# Patient Record
Sex: FEMALE | Race: WHITE | NOT HISPANIC OR LATINO | Employment: FULL TIME | URBAN - NONMETROPOLITAN AREA
[De-identification: names, ages, dates, MRNs, and addresses within clinical notes are randomized per-mention and may not be internally consistent; named-entity substitution may affect disease eponyms.]

---

## 2018-01-11 ENCOUNTER — HOSPITAL ENCOUNTER (EMERGENCY)
Facility: HOSPITAL | Age: 58
Discharge: HOME | End: 2018-01-11
Attending: EMERGENCY MEDICINE | Admitting: EMERGENCY MEDICINE

## 2018-01-11 VITALS
HEART RATE: 72 BPM | DIASTOLIC BLOOD PRESSURE: 92 MMHG | TEMPERATURE: 98.9 F | BODY MASS INDEX: 35.08 KG/M2 | HEIGHT: 59 IN | RESPIRATION RATE: 18 BRPM | OXYGEN SATURATION: 97 % | WEIGHT: 174 LBS | SYSTOLIC BLOOD PRESSURE: 190 MMHG

## 2018-01-11 DIAGNOSIS — R55 SYNCOPE, UNSPECIFIED SYNCOPE TYPE: Primary | ICD-10-CM

## 2018-01-11 LAB
ANION GAP SERPL CALCULATED.3IONS-SCNC: 9 MMOL/L (ref 5–15)
BASOPHILS # BLD AUTO: 0.02 10*3/MM3 (ref 0–0.2)
BASOPHILS NFR BLD AUTO: 0.2 % (ref 0–2)
BUN BLD-MCNC: 11 MG/DL (ref 7–21)
BUN/CREAT SERPL: 17.2 (ref 7–25)
CALCIUM SPEC-SCNC: 9 MG/DL (ref 8.4–10.2)
CHLORIDE SERPL-SCNC: 101 MMOL/L (ref 95–110)
CO2 SERPL-SCNC: 29 MMOL/L (ref 22–31)
CREAT BLD-MCNC: 0.64 MG/DL (ref 0.5–1)
DEPRECATED RDW RBC AUTO: 39.8 FL (ref 36.4–46.3)
EOSINOPHIL # BLD AUTO: 0.43 10*3/MM3 (ref 0–0.7)
EOSINOPHIL NFR BLD AUTO: 3.9 % (ref 0–7)
ERYTHROCYTE [DISTWIDTH] IN BLOOD BY AUTOMATED COUNT: 13.8 % (ref 11.5–14.5)
GFR SERPL CREATININE-BSD FRML MDRD: 96 ML/MIN/1.73 (ref 51–120)
GLUCOSE BLD-MCNC: 110 MG/DL (ref 60–100)
HCT VFR BLD AUTO: 38.4 % (ref 35–45)
HGB BLD-MCNC: 13.1 G/DL (ref 12–15.5)
HOLD SPECIMEN: NORMAL
IMM GRANULOCYTES # BLD: 0.02 10*3/MM3 (ref 0–0.02)
IMM GRANULOCYTES NFR BLD: 0.2 % (ref 0–0.5)
LYMPHOCYTES # BLD AUTO: 2.45 10*3/MM3 (ref 0.6–4.2)
LYMPHOCYTES NFR BLD AUTO: 22.5 % (ref 10–50)
MCH RBC QN AUTO: 27.2 PG (ref 26.5–34)
MCHC RBC AUTO-ENTMCNC: 34.1 G/DL (ref 31.4–36)
MCV RBC AUTO: 79.7 FL (ref 80–98)
MONOCYTES # BLD AUTO: 0.89 10*3/MM3 (ref 0–0.9)
MONOCYTES NFR BLD AUTO: 8.2 % (ref 0–12)
NEUTROPHILS # BLD AUTO: 7.09 10*3/MM3 (ref 2–8.6)
NEUTROPHILS NFR BLD AUTO: 65 % (ref 37–80)
PLATELET # BLD AUTO: 348 10*3/MM3 (ref 150–450)
PMV BLD AUTO: 9.9 FL (ref 8–12)
POTASSIUM BLD-SCNC: 3.3 MMOL/L (ref 3.5–5.1)
RBC # BLD AUTO: 4.82 10*6/MM3 (ref 3.77–5.16)
SODIUM BLD-SCNC: 139 MMOL/L (ref 137–145)
WBC NRBC COR # BLD: 10.9 10*3/MM3 (ref 3.2–9.8)
WHOLE BLOOD HOLD SPECIMEN: NORMAL

## 2018-01-11 PROCEDURE — 93005 ELECTROCARDIOGRAM TRACING: CPT | Performed by: EMERGENCY MEDICINE

## 2018-01-11 PROCEDURE — 93010 ELECTROCARDIOGRAM REPORT: CPT | Performed by: INTERNAL MEDICINE

## 2018-01-11 PROCEDURE — 99283 EMERGENCY DEPT VISIT LOW MDM: CPT

## 2018-01-11 PROCEDURE — 85025 COMPLETE CBC W/AUTO DIFF WBC: CPT | Performed by: EMERGENCY MEDICINE

## 2018-01-11 PROCEDURE — 80048 BASIC METABOLIC PNL TOTAL CA: CPT | Performed by: EMERGENCY MEDICINE

## 2018-01-11 RX ORDER — HYDROCODONE BITARTRATE AND ACETAMINOPHEN 7.5; 325 MG/1; MG/1
1 TABLET ORAL EVERY 6 HOURS PRN
COMMUNITY

## 2018-01-11 RX ORDER — OMEPRAZOLE 40 MG/1
40 CAPSULE, DELAYED RELEASE ORAL DAILY
COMMUNITY

## 2018-01-12 NOTE — DISCHARGE INSTRUCTIONS
Syncope  Introduction  Syncope is when you lose temporarily pass out (faint). Signs that you may be about to pass out include:  · Feeling dizzy or light-headed.  · Feeling sick to your stomach (nauseous).  · Seeing all white or all black.  · Having cold, clammy skin.  If you passed out, get help right away. Call your local emergency services (331 in the [place] Do not drive yourself to the hospital.  Follow these instructions at home:  Pay attention to any changes in your symptoms. Take these actions to help with your condition:  · Have someone stay with you until you feel stable.  · Do not drive, use machinery, or play sports until your doctor says it is okay.  · Keep all follow-up visits as told by your doctor. This is important.  · If you start to feel like you might pass out, lie down right away and raise (elevate) your feet above the level of your heart. Breathe deeply and steadily. Wait until all of the symptoms are gone.  · Drink enough fluid to keep your pee (urine) clear or pale yellow.  · If you are taking blood pressure or heart medicine, get up slowly and spend many minutes getting ready to sit and then stand. This can help with dizziness.  · Take over-the-counter and prescription medicines only as told by your doctor.  Get help right away if:  · You have a very bad headache.  · You have unusual pain in your chest, tummy, or back.  · You are bleeding from your mouth or rectum.  · You have black or tarry poop (stool).  · You have a very fast or uneven heartbeat (palpitations).  · It hurts to breathe.  · You pass out once or more than once.  · You have jerky movements that you cannot control (seizure).  · You are confused.  · You have trouble walking.  · You are very weak.  · You have vision problems.  These symptoms may be an emergency. Do not wait to see if the symptoms will go away. Get medical help right away. Call your local emergency services (541 in the [place] Do not drive yourself to the  Lists of hospitals in the [place].   This information is not intended to replace advice given to you by your health care provider. Make sure you discuss any questions you have with your health care provider.  Document Released: 06/05/2009 Document Revised: 05/25/2017 Document Reviewed: 08/31/2016  © 2017 Elsevier      Hypertension  Hypertension, commonly called high blood pressure, is when the force of blood pumping through your arteries is too strong. Your arteries are the blood vessels that carry blood from your heart throughout your body. A blood pressure reading consists of a higher number over a lower number, such as 110/72. The higher number (systolic) is the pressure inside your arteries when your heart pumps. The lower number (diastolic) is the pressure inside your arteries when your heart relaxes. Ideally you want your blood pressure below 120/80.  Hypertension forces your heart to work harder to pump blood. Your arteries may become narrow or stiff. Having untreated or uncontrolled hypertension can cause heart attack, stroke, kidney disease, and other problems.  What increases the risk?  Some risk factors for high blood pressure are controllable. Others are not.  Risk factors you cannot control include:  · Race. You may be at higher risk if you are .  · Age. Risk increases with age.  · Gender. Men are at higher risk than women before age 45 years. After age 65, women are at higher risk than men.  Risk factors you can control include:  · Not getting enough exercise or physical activity.  · Being overweight.  · Getting too much fat, sugar, calories, or salt in your diet.  · Drinking too much alcohol.  What are the signs or symptoms?  Hypertension does not usually cause signs or symptoms. Extremely high blood pressure (hypertensive crisis) may cause headache, anxiety, shortness of breath, and nosebleed.  How is this diagnosed?  To check if you have hypertension, your health care provider will measure your blood  pressure while you are seated, with your arm held at the level of your heart. It should be measured at least twice using the same arm. Certain conditions can cause a difference in blood pressure between your right and left arms. A blood pressure reading that is higher than normal on one occasion does not mean that you need treatment. If it is not clear whether you have high blood pressure, you may be asked to return on a different day to have your blood pressure checked again. Or, you may be asked to monitor your blood pressure at home for 1 or more weeks.  How is this treated?  Treating high blood pressure includes making lifestyle changes and possibly taking medicine. Living a healthy lifestyle can help lower high blood pressure. You may need to change some of your habits.  Lifestyle changes may include:  · Following the DASH diet. This diet is high in fruits, vegetables, and whole grains. It is low in salt, red meat, and added sugars.  · Keep your sodium intake below 2,300 mg per day.  · Getting at least 30-45 minutes of aerobic exercise at least 4 times per week.  · Losing weight if necessary.  · Not smoking.  · Limiting alcoholic beverages.  · Learning ways to reduce stress.  Your health care provider may prescribe medicine if lifestyle changes are not enough to get your blood pressure under control, and if one of the following is true:  · You are 18-59 years of age and your systolic blood pressure is above 140.  · You are 60 years of age or older, and your systolic blood pressure is above 150.  · Your diastolic blood pressure is above 90.  · You have diabetes, and your systolic blood pressure is over 140 or your diastolic blood pressure is over 90.  · You have kidney disease and your blood pressure is above 140/90.  · You have heart disease and your blood pressure is above 140/90.  Your personal target blood pressure may vary depending on your medical conditions, your age, and other factors.  Follow these  instructions at home:  · Have your blood pressure rechecked as directed by your health care provider.  · Take medicines only as directed by your health care provider. Follow the directions carefully. Blood pressure medicines must be taken as prescribed. The medicine does not work as well when you skip doses. Skipping doses also puts you at risk for problems.  · Do not smoke.  · Monitor your blood pressure at home as directed by your health care provider.  Contact a health care provider if:  · You think you are having a reaction to medicines taken.  · You have recurrent headaches or feel dizzy.  · You have swelling in your ankles.  · You have trouble with your vision.  Get help right away if:  · You develop a severe headache or confusion.  · You have unusual weakness, numbness, or feel faint.  · You have severe chest or abdominal pain.  · You vomit repeatedly.  · You have trouble breathing.  This information is not intended to replace advice given to you by your health care provider. Make sure you discuss any questions you have with your health care provider.  Document Released: 12/18/2006 Document Revised: 05/25/2017 Document Reviewed: 10/10/2014  [email or web address] Interactive Patient Education © 2017 [employer]

## 2018-01-12 NOTE — ED PROVIDER NOTES
Subjective   History of Present Illness  57-year-old female who history of hypertension presents emergency Department after syncopal episode today.  She states she is in the hospital with her ex- as well as her father.  She went to [place] and came back.  She was gone her father had a seizure.  She felt very guilty about leaving him in the hospital was extremely upset with herself.  She went to the bathroom to beat on the sink when she became lightheaded and slumped to the ground.  She was caught by family members and did not fall or strike her head.  She denies having any chest pain difficulty breathing or headache during the episode.  After waking she denies having these symptoms as well.  Currently she denies having any symptoms and states that she wants to go back upstairs to be with her family members.  She does not take medications for blood pressure.  Review of Systems   Constitutional: Negative for chills and fever.   HENT: Negative for rhinorrhea, sinus pressure and sneezing.    Eyes: Negative for pain and redness.   Respiratory: Negative for cough, chest tightness and shortness of breath.    Cardiovascular: Negative for chest pain, palpitations and leg swelling.   Gastrointestinal: Negative for abdominal pain, diarrhea, nausea and vomiting.   Genitourinary: Negative for dysuria, flank pain, menstrual problem, pelvic pain, vaginal bleeding, vaginal discharge and vaginal pain.   Musculoskeletal: Negative for arthralgias, back pain and joint swelling.   Neurological: Positive for syncope. Negative for dizziness, weakness, numbness and headaches.   Hematological: Negative.    Psychiatric/Behavioral: Negative for self-injury and suicidal ideas.       Past Medical History:   Diagnosis Date   • Arthritis    • Fibromyalgia    • Hypertension        Allergies   Allergen Reactions   • Biaxin [Clarithromycin]    • Penicillins        History reviewed. No pertinent surgical history.    History reviewed. No  pertinent family history.    Social History     Social History   • Marital status:      Spouse name: N/A   • Number of children: N/A   • Years of education: N/A     Social History Main Topics   • Smoking status: Never Smoker   • Smokeless tobacco: Never Used   • Alcohol use No   • Drug use: None   • Sexual activity: Not Asked     Other Topics Concern   • None     Social History Narrative   • None           Objective   Physical Exam   Constitutional: She is oriented to person, place, and time. She appears well-developed and well-nourished.   HENT:   Head: Normocephalic and atraumatic.   Eyes: EOM are normal. Pupils are equal, round, and reactive to light.   Neck: Normal range of motion. Neck supple.   No carotid bruit   Cardiovascular: Normal rate, regular rhythm and normal heart sounds.    No murmur heard.  Pulmonary/Chest: Effort normal and breath sounds normal. No respiratory distress. She has no wheezes. She has no rales.   Abdominal: Soft. Bowel sounds are normal. She exhibits no distension. There is no tenderness.   Musculoskeletal: Normal range of motion.   Neurological: She is alert and oriented to person, place, and time.   Skin: Skin is warm and dry.   Psychiatric: She has a normal mood and affect.   Nursing note and vitals reviewed.      ECG 12 Lead    Date/Time: 1/11/2018 10:53 PM  Performed by: MARYANA COFFMAN  Authorized by: MARYANA COFFMAN   Interpreted by physician  Comments: Sinus rate of 73.  Normal QT/QTc interval.  Normal IN interval.  No significant change from previous EKG.  No concerns for WPW, Brugada, hypertrophic cardiomyopathy, long or short QT syndrome.  No STEMI               ED Course  ED Course      Labs Reviewed   BASIC METABOLIC PANEL - Abnormal; Notable for the following:        Result Value    Glucose 110 (*)     Potassium 3.3 (*)     All other components within normal limits   CBC WITH AUTO DIFFERENTIAL - Abnormal; Notable for the following:     WBC 10.90  (*)     MCV 79.7 (*)     All other components within normal limits   RAINBOW DRAW    Narrative:     The following orders were created for panel order [place] Draw.  Procedure                               Abnormality         Status                     ---------                               -----------         ------                     Light Blue Top[[id number]]                                    In process                 Green Top (Gel)[[id number]]                                                              Lavender Top[[id number]]                                                                 Gold Top - SST[[id number]]                                    In process                   Please view results for these tests on the individual orders.   CBC AND DIFFERENTIAL    Narrative:     The following orders were created for panel order CBC & Differential.  Procedure                               Abnormality         Status                     ---------                               -----------         ------                     CBC Auto Differential[[id number]]         Abnormal            Final result                 Please view results for these tests on the individual orders.   LIGHT BLUE TOP   GREEN TOP   LAVENDER TOP   GOLD TOP - SST                 MDM  Number of Diagnoses or Management Options  Syncope, unspecified syncope type:   Diagnosis management comments: Patient with what sounded of vasovagal syncopal episode.  We'll check an EKG is some basic lab work on her.  She is markedly hypertensive but she does not take any medications for her blood pressure.  I expect this is likely close to her baseline.  Like give her any blood pressure medications at this time she has no symptoms and do not believe this syncopal episode is related to her blood pressure.    Normal renal function.  Normal hemoglobin.  No signs of end organ damage from her hypertension.  As stated she has no chest pain multiple status headaches or  shortness of breath suggest workup for her hypertension at this time.  We'll discharge the patient and have her follow primary care doctor regarding her uncontrolled hypertension  EKG with no concern for lack laterality as etiology of patient's symptoms.  Remaining electrolytes and likely discharge the patient      Final diagnoses:   Syncope, unspecified syncope type            Nuno Gomez MD  01/11/18 [id number]

## 2019-01-05 ENCOUNTER — APPOINTMENT (OUTPATIENT)
Dept: GENERAL RADIOLOGY | Facility: HOSPITAL | Age: 59
End: 2019-01-05

## 2019-01-05 ENCOUNTER — HOSPITAL ENCOUNTER (EMERGENCY)
Facility: HOSPITAL | Age: 59
Discharge: HOME | End: 2019-01-05
Attending: FAMILY MEDICINE | Admitting: FAMILY MEDICINE

## 2019-01-05 VITALS
TEMPERATURE: 97.9 F | SYSTOLIC BLOOD PRESSURE: 160 MMHG | HEART RATE: 73 BPM | OXYGEN SATURATION: 96 % | BODY MASS INDEX: 34.27 KG/M2 | RESPIRATION RATE: 18 BRPM | DIASTOLIC BLOOD PRESSURE: 76 MMHG | HEIGHT: 59 IN | WEIGHT: 170 LBS

## 2019-01-05 DIAGNOSIS — S93.401A SPRAIN OF RIGHT ANKLE, UNSPECIFIED LIGAMENT, INITIAL ENCOUNTER: Primary | ICD-10-CM

## 2019-01-05 PROCEDURE — 73630 X-RAY EXAM OF FOOT: CPT

## 2019-01-05 PROCEDURE — 99284 EMERGENCY DEPT VISIT MOD MDM: CPT

## 2019-01-05 PROCEDURE — 73610 X-RAY EXAM OF ANKLE: CPT

## 2019-01-05 RX ORDER — PROPRANOLOL HYDROCHLORIDE 10 MG/1
10 TABLET ORAL 2 TIMES DAILY
COMMUNITY
End: 2019-09-04 | Stop reason: HOSPADM

## 2019-01-05 RX ORDER — CLONIDINE HYDROCHLORIDE 0.2 MG/1
0.2 TABLET ORAL ONCE
Status: COMPLETED | OUTPATIENT
Start: 2019-01-05 | End: 2019-01-05

## 2019-01-05 RX ADMIN — CLONIDINE HYDROCHLORIDE 0.2 MG: 0.2 TABLET ORAL at 15:50
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
## 2019-09-01 ENCOUNTER — HOSPITAL ENCOUNTER (INPATIENT)
Facility: HOSPITAL | Age: 59
LOS: 3 days | Discharge: HOME | End: 2019-09-04
Attending: EMERGENCY MEDICINE | Admitting: HOSPITALIST

## 2019-09-01 ENCOUNTER — APPOINTMENT (OUTPATIENT)
Dept: GENERAL RADIOLOGY | Facility: HOSPITAL | Age: 59
End: 2019-09-01

## 2019-09-01 DIAGNOSIS — I21.4 NSTEMI (NON-ST ELEVATED MYOCARDIAL INFARCTION) (HCC): Primary | ICD-10-CM

## 2019-09-01 LAB
ANION GAP SERPL CALCULATED.3IONS-SCNC: 14 MMOL/L (ref 5–15)
APTT PPP: 31.4 SECONDS (ref 20–40.3)
BASOPHILS # BLD AUTO: 0.03 10*3/MM3 (ref 0–0.2)
BASOPHILS NFR BLD AUTO: 0.3 % (ref 0–1.5)
BUN BLD-MCNC: 6 MG/DL (ref 6–20)
BUN/CREAT SERPL: 9.1 (ref 7–25)
CALCIUM SPEC-SCNC: 9 MG/DL (ref 8.6–10.5)
CHLORIDE SERPL-SCNC: 102 MMOL/L (ref 98–107)
CO2 SERPL-SCNC: 27 MMOL/L (ref 22–29)
CREAT BLD-MCNC: 0.66 MG/DL (ref 0.57–1)
D-DIMER, QUANTITATIVE (MAD,POW, STR): 321 NG/ML (FEU) (ref 0–470)
DEPRECATED RDW RBC AUTO: 39.6 FL (ref 37–54)
EOSINOPHIL # BLD AUTO: 0.29 10*3/MM3 (ref 0–0.4)
EOSINOPHIL NFR BLD AUTO: 3.1 % (ref 0.3–6.2)
ERYTHROCYTE [DISTWIDTH] IN BLOOD BY AUTOMATED COUNT: 13.8 % (ref 12.3–15.4)
GFR SERPL CREATININE-BSD FRML MDRD: 92 ML/MIN/1.73
GLUCOSE BLD-MCNC: 105 MG/DL (ref 65–99)
HCT VFR BLD AUTO: 36.9 % (ref 34–46.6)
HGB BLD-MCNC: 12.7 G/DL (ref 12–15.9)
HOLD SPECIMEN: NORMAL
HOLD SPECIMEN: NORMAL
IMM GRANULOCYTES # BLD AUTO: 0.03 10*3/MM3 (ref 0–0.05)
IMM GRANULOCYTES NFR BLD AUTO: 0.3 % (ref 0–0.5)
INR PPP: 1.03 (ref 0.8–1.2)
LYMPHOCYTES # BLD AUTO: 2.58 10*3/MM3 (ref 0.7–3.1)
LYMPHOCYTES NFR BLD AUTO: 27.9 % (ref 19.6–45.3)
MCH RBC QN AUTO: 27.2 PG (ref 26.6–33)
MCHC RBC AUTO-ENTMCNC: 34.4 G/DL (ref 31.5–35.7)
MCV RBC AUTO: 79 FL (ref 79–97)
MONOCYTES # BLD AUTO: 0.58 10*3/MM3 (ref 0.1–0.9)
MONOCYTES NFR BLD AUTO: 6.3 % (ref 5–12)
NEUTROPHILS # BLD AUTO: 5.74 10*3/MM3 (ref 1.7–7)
NEUTROPHILS NFR BLD AUTO: 62.1 % (ref 42.7–76)
NRBC BLD AUTO-RTO: 0 /100 WBC (ref 0–0.2)
PLATELET # BLD AUTO: 293 10*3/MM3 (ref 140–450)
PMV BLD AUTO: 10.1 FL (ref 6–12)
POTASSIUM BLD-SCNC: 3 MMOL/L (ref 3.5–5.2)
PROTHROMBIN TIME: 13.3 SECONDS (ref 11.1–15.3)
RBC # BLD AUTO: 4.67 10*6/MM3 (ref 3.77–5.28)
SODIUM BLD-SCNC: 143 MMOL/L (ref 136–145)
TROPONIN T SERPL-MCNC: 0.21 NG/ML (ref 0–0.03)
TROPONIN T SERPL-MCNC: 0.39 NG/ML (ref 0–0.03)
TROPONIN T SERPL-MCNC: 0.43 NG/ML (ref 0–0.03)
WBC NRBC COR # BLD: 9.25 10*3/MM3 (ref 3.4–10.8)
WHOLE BLOOD HOLD SPECIMEN: NORMAL
WHOLE BLOOD HOLD SPECIMEN: NORMAL

## 2019-09-01 PROCEDURE — 85025 COMPLETE CBC W/AUTO DIFF WBC: CPT | Performed by: EMERGENCY MEDICINE

## 2019-09-01 PROCEDURE — 71046 X-RAY EXAM CHEST 2 VIEWS: CPT

## 2019-09-01 PROCEDURE — 80048 BASIC METABOLIC PNL TOTAL CA: CPT | Performed by: EMERGENCY MEDICINE

## 2019-09-01 PROCEDURE — 94799 UNLISTED PULMONARY SVC/PX: CPT

## 2019-09-01 PROCEDURE — 93005 ELECTROCARDIOGRAM TRACING: CPT

## 2019-09-01 PROCEDURE — 25010000002 MORPHINE PER 10 MG: Performed by: EMERGENCY MEDICINE

## 2019-09-01 PROCEDURE — 93005 ELECTROCARDIOGRAM TRACING: CPT | Performed by: INTERNAL MEDICINE

## 2019-09-01 PROCEDURE — 85730 THROMBOPLASTIN TIME PARTIAL: CPT | Performed by: EMERGENCY MEDICINE

## 2019-09-01 PROCEDURE — 93005 ELECTROCARDIOGRAM TRACING: CPT | Performed by: EMERGENCY MEDICINE

## 2019-09-01 PROCEDURE — 25010000002 ENOXAPARIN PER 10 MG: Performed by: EMERGENCY MEDICINE

## 2019-09-01 PROCEDURE — 84484 ASSAY OF TROPONIN QUANT: CPT | Performed by: EMERGENCY MEDICINE

## 2019-09-01 PROCEDURE — 25010000002 DIPHENHYDRAMINE PER 50 MG: Performed by: INTERNAL MEDICINE

## 2019-09-01 PROCEDURE — 94760 N-INVAS EAR/PLS OXIMETRY 1: CPT

## 2019-09-01 PROCEDURE — 85610 PROTHROMBIN TIME: CPT | Performed by: EMERGENCY MEDICINE

## 2019-09-01 PROCEDURE — 36415 COLL VENOUS BLD VENIPUNCTURE: CPT | Performed by: EMERGENCY MEDICINE

## 2019-09-01 PROCEDURE — 25010000002 ONDANSETRON PER 1 MG: Performed by: EMERGENCY MEDICINE

## 2019-09-01 PROCEDURE — 85379 FIBRIN DEGRADATION QUANT: CPT | Performed by: EMERGENCY MEDICINE

## 2019-09-01 PROCEDURE — 99285 EMERGENCY DEPT VISIT HI MDM: CPT

## 2019-09-01 RX ORDER — CLOPIDOGREL BISULFATE 75 MG/1
300 TABLET ORAL ONCE
Status: COMPLETED | OUTPATIENT
Start: 2019-09-01 | End: 2019-09-01

## 2019-09-01 RX ORDER — HYDROCODONE BITARTRATE AND ACETAMINOPHEN 7.5; 325 MG/1; MG/1
1 TABLET ORAL 2 TIMES DAILY PRN
Status: DISCONTINUED | OUTPATIENT
Start: 2019-09-01 | End: 2019-09-04 | Stop reason: HOSPADM

## 2019-09-01 RX ORDER — ACETAMINOPHEN 650 MG/1
650 SUPPOSITORY RECTAL EVERY 4 HOURS PRN
Status: DISCONTINUED | OUTPATIENT
Start: 2019-09-01 | End: 2019-09-04 | Stop reason: HOSPADM

## 2019-09-01 RX ORDER — NITROGLYCERIN 20 MG/100ML
10-50 INJECTION INTRAVENOUS
Status: DISCONTINUED | OUTPATIENT
Start: 2019-09-01 | End: 2019-09-02

## 2019-09-01 RX ORDER — ONDANSETRON 2 MG/ML
4 INJECTION INTRAMUSCULAR; INTRAVENOUS ONCE
Status: DISCONTINUED | OUTPATIENT
Start: 2019-09-01 | End: 2019-09-01

## 2019-09-01 RX ORDER — SODIUM CHLORIDE 0.9 % (FLUSH) 0.9 %
10 SYRINGE (ML) INJECTION AS NEEDED
Status: DISCONTINUED | OUTPATIENT
Start: 2019-09-01 | End: 2019-09-04 | Stop reason: HOSPADM

## 2019-09-01 RX ORDER — FAMOTIDINE 10 MG/ML
20 INJECTION, SOLUTION INTRAVENOUS ONCE
Status: COMPLETED | OUTPATIENT
Start: 2019-09-01 | End: 2019-09-01

## 2019-09-01 RX ORDER — ONDANSETRON 2 MG/ML
4 INJECTION INTRAMUSCULAR; INTRAVENOUS ONCE
Status: COMPLETED | OUTPATIENT
Start: 2019-09-01 | End: 2019-09-01

## 2019-09-01 RX ORDER — PANTOPRAZOLE SODIUM 40 MG/1
40 TABLET, DELAYED RELEASE ORAL EVERY MORNING
Status: DISCONTINUED | OUTPATIENT
Start: 2019-09-02 | End: 2019-09-04 | Stop reason: HOSPADM

## 2019-09-01 RX ORDER — DIPHENHYDRAMINE HYDROCHLORIDE 50 MG/ML
12.5 INJECTION INTRAMUSCULAR; INTRAVENOUS EVERY 6 HOURS PRN
Status: DISCONTINUED | OUTPATIENT
Start: 2019-09-01 | End: 2019-09-04 | Stop reason: HOSPADM

## 2019-09-01 RX ORDER — ACETAMINOPHEN 160 MG/5ML
650 SOLUTION ORAL EVERY 4 HOURS PRN
Status: DISCONTINUED | OUTPATIENT
Start: 2019-09-01 | End: 2019-09-04 | Stop reason: HOSPADM

## 2019-09-01 RX ORDER — NITROGLYCERIN 0.4 MG/1
0.4 TABLET SUBLINGUAL
Status: DISCONTINUED | OUTPATIENT
Start: 2019-09-01 | End: 2019-09-02

## 2019-09-01 RX ORDER — POTASSIUM CHLORIDE 750 MG/1
40 CAPSULE, EXTENDED RELEASE ORAL ONCE
Status: COMPLETED | OUTPATIENT
Start: 2019-09-01 | End: 2019-09-01

## 2019-09-01 RX ORDER — SODIUM CHLORIDE 9 MG/ML
100 INJECTION, SOLUTION INTRAVENOUS CONTINUOUS
Status: DISCONTINUED | OUTPATIENT
Start: 2019-09-01 | End: 2019-09-03

## 2019-09-01 RX ORDER — SODIUM CHLORIDE 0.9 % (FLUSH) 0.9 %
10 SYRINGE (ML) INJECTION EVERY 12 HOURS SCHEDULED
Status: DISCONTINUED | OUTPATIENT
Start: 2019-09-01 | End: 2019-09-04 | Stop reason: HOSPADM

## 2019-09-01 RX ORDER — ASPIRIN 81 MG/1
324 TABLET, CHEWABLE ORAL ONCE
Status: DISCONTINUED | OUTPATIENT
Start: 2019-09-01 | End: 2019-09-01

## 2019-09-01 RX ORDER — DIPHENHYDRAMINE HYDROCHLORIDE 50 MG/ML
12.5 INJECTION INTRAMUSCULAR; INTRAVENOUS ONCE
Status: COMPLETED | OUTPATIENT
Start: 2019-09-01 | End: 2019-09-01

## 2019-09-01 RX ORDER — ACETAMINOPHEN 325 MG/1
650 TABLET ORAL EVERY 4 HOURS PRN
Status: DISCONTINUED | OUTPATIENT
Start: 2019-09-01 | End: 2019-09-04 | Stop reason: HOSPADM

## 2019-09-01 RX ADMIN — SODIUM CHLORIDE 125 ML/HR: 9 INJECTION, SOLUTION INTRAVENOUS at 14:40

## 2019-09-01 RX ADMIN — CLOPIDOGREL BISULFATE 300 MG: 75 TABLET ORAL at 15:57

## 2019-09-01 RX ADMIN — NITROGLYCERIN 0.4 MG: 0.4 TABLET, ORALLY DISINTEGRATING SUBLINGUAL at 15:25

## 2019-09-01 RX ADMIN — MORPHINE SULFATE 4 MG: 4 INJECTION INTRAVENOUS at 14:41

## 2019-09-01 RX ADMIN — MORPHINE SULFATE 4 MG: 4 INJECTION INTRAVENOUS at 15:59

## 2019-09-01 RX ADMIN — ONDANSETRON 4 MG: 2 INJECTION INTRAMUSCULAR; INTRAVENOUS at 16:54

## 2019-09-01 RX ADMIN — POTASSIUM CHLORIDE 40 MEQ: 750 CAPSULE, EXTENDED RELEASE ORAL at 15:56

## 2019-09-01 RX ADMIN — FAMOTIDINE 20 MG: 10 INJECTION INTRAVENOUS at 14:43

## 2019-09-01 RX ADMIN — SODIUM CHLORIDE, PRESERVATIVE FREE 10 ML: 5 INJECTION INTRAVENOUS at 21:13

## 2019-09-01 RX ADMIN — NITROGLYCERIN 10 MCG/MIN: 20 INJECTION INTRAVENOUS at 16:09

## 2019-09-01 RX ADMIN — DIPHENHYDRAMINE HYDROCHLORIDE 12.5 MG: 50 INJECTION INTRAMUSCULAR; INTRAVENOUS at 16:35

## 2019-09-01 RX ADMIN — ONDANSETRON 4 MG: 2 INJECTION INTRAMUSCULAR; INTRAVENOUS at 14:46

## 2019-09-01 RX ADMIN — SODIUM CHLORIDE 125 ML/HR: 9 INJECTION, SOLUTION INTRAVENOUS at 21:20

## 2019-09-01 RX ADMIN — ENOXAPARIN SODIUM 70 MG: 80 INJECTION SUBCUTANEOUS at 16:11

## 2019-09-01 RX ADMIN — SODIUM CHLORIDE 1000 ML: 900 INJECTION, SOLUTION INTRAVENOUS at 16:45

## 2019-09-01 NOTE — ED NOTES
Pt is hypotensive at this time. Nitro drip stopped. MD made aware. New order for liter bolus NS.     Capri Valladares RN  09/01/19 [id number]

## 2019-09-01 NOTE — ED NOTES
Pt is complaining of itching all over. Natalia gave benadryl. Patient states that symptoms started shortly after lovenox injection. Pt is very nauseated at this time. Pt is dry heaving.      Capri Valladares RN  09/01/19 [id number]

## 2019-09-01 NOTE — ED PROVIDER NOTES
"Subjective   60yo female pmh significant ddd presents ED c/o acute onset substernal chest pain 1200hrs characterized as \"stabbing\" pain/radiating upper back, neck, LUE/associated soa, nausea/neg exac or relieve factors; pt rates pain 8/10 at time of exam.  ROS neg fever/cough/parada/edema/syncope/palpitations/hemoptysis.        History provided by:  Patient  Chest Pain   Pain location:  Substernal area  Pain quality: stabbing    Pain radiates to:  Neck, upper back and L arm  Pain severity:  Moderate  Onset quality:  Sudden  Duration:  3 hours  Timing:  Constant  Chronicity:  New  Associated symptoms: back pain, nausea and shortness of breath    Associated symptoms: no abdominal pain, no cough and no palpitations        Review of Systems   Constitutional: Negative.    HENT: Negative for congestion.    Respiratory: Positive for shortness of breath. Negative for cough.    Cardiovascular: Positive for chest pain. Negative for palpitations and leg swelling.   Gastrointestinal: Positive for nausea. Negative for abdominal pain.   Genitourinary: Negative.    Musculoskeletal: Positive for back pain.   Skin: Negative.    Allergic/Immunologic: Negative for immunocompromised state.   Neurological: Negative for syncope.   All other systems reviewed and are negative.      Past Medical History:   Diagnosis Date   • Arthritis    • Fibromyalgia    • Hypertension        Allergies   Allergen Reactions   • Biaxin [Clarithromycin]    • Lovenox [Enoxaparin] Itching   • Penicillins    • Prednisone Anxiety and Mental Status Change     Patient states that she becomes very \"mean and irritable\".        History reviewed. No pertinent surgical history.    History reviewed. No pertinent family history.    Social History     Socioeconomic History   • Marital status:      Spouse name: Not on file   • Number of children: Not on file   • Years of education: Not on file   • Highest education level: Not on file   Tobacco Use   • Smoking status: " Never Smoker   • Smokeless tobacco: Never Used   Substance and Sexual Activity   • Alcohol use: No   • Drug use: No           Objective   Physical Exam   Constitutional: She is oriented to person, place, and time. She appears well-developed and well-nourished.   HENT:   Head: Normocephalic and atraumatic.   Mouth/Throat: Oropharynx is clear and moist.   Eyes: Pupils are equal, round, and reactive to light.   Neck: Normal range of motion. Neck supple. No JVD present. No tracheal deviation present.   Cardiovascular: Normal rate, regular rhythm, normal heart sounds and intact distal pulses. Exam reveals no gallop and no friction rub.   No murmur heard.  Pulmonary/Chest: Effort normal and breath sounds normal. She has no wheezes. She has no rales. She exhibits tenderness.       Abdominal: Soft. Bowel sounds are normal. She exhibits no distension and no mass. There is no tenderness. There is no rebound and no guarding.   Musculoskeletal: She exhibits no edema or tenderness.   Lymphadenopathy:     She has no cervical adenopathy.   Neurological: She is alert and oriented to person, place, and time.   Skin: Skin is warm and dry.   Nursing note and vitals reviewed.      ECG 12 Lead    Date/Time: 9/1/2019 2:53 PM  Performed by: Bennie Starkey MD  Authorized by: Bennie Starkey MD   Interpreted by physician  Rhythm: sinus rhythm  Rate: normal  BPM: 80  QRS axis: normal  Conduction: conduction normal  ST Segments: ST segments normal  T depression: aVL  Other findings: LAE  Clinical impression: abnormal ECG    ECG 12 Lead    Date/Time: 9/1/2019 2:54 PM  Performed by: Bennie Starkey MD  Authorized by: Bennie Starkey MD   Interpreted by physician  Rhythm: sinus rhythm  Rate: normal  BPM: 63  QRS axis: normal  Conduction: conduction normal  ST Segments: ST segments normal  T depression: aVL  Other findings: LAE  Clinical impression: abnormal ECG                 ED Course  ED Course as of Sep 02 0056   Sun Sep 01, 2019   1538   Ahs reviewed EKG #1,2: no convincing STEMI.  [SD]   1551 D/w Dr. AHYDER Pulido. Requests Dr. Goldstein consult.  [SD]   2334 Dr. Goldstein consulted.  [SD]      ED Course User Index  [SD] Bennie Starkey MD        Labs Reviewed   BASIC METABOLIC PANEL - Abnormal; Notable for the following components:       Result Value    Glucose 105 (*)     Potassium 3.0 (*)     All other components within normal limits    Narrative:     GFR Normal >60  Chronic Kidney Disease <60  Kidney Failure <15   TROPONIN (IN-HOUSE) - Abnormal; Notable for the following components:    Troponin T 0.212 (*)     All other components within normal limits    Narrative:     Troponin T Reference Range:  <= 0.03 ng/mL-   Negative for AMI  >0.03 ng/mL-     Abnormal for myocardial necrosis.  Clinicians would have to utilize clinical acumen, EKG, Troponin and serial changes to determine if it is an Acute Myocardial Infarction or myocardial injury due to an underlying chronic condition.    TROPONIN (IN-HOUSE) - Abnormal; Notable for the following components:    Troponin T 0.429 (*)     All other components within normal limits    Narrative:     Troponin T Reference Range:  <= 0.03 ng/mL-   Negative for AMI  >0.03 ng/mL-     Abnormal for myocardial necrosis.  Clinicians would have to utilize clinical acumen, EKG, Troponin and serial changes to determine if it is an Acute Myocardial Infarction or myocardial injury due to an underlying chronic condition.    TROPONIN (IN-HOUSE) - Abnormal; Notable for the following components:    Troponin T 0.386 (*)     All other components within normal limits    Narrative:     Troponin T Reference Range:  <= 0.03 ng/mL-   Negative for AMI  >0.03 ng/mL-     Abnormal for myocardial necrosis.  Clinicians would have to utilize clinical acumen, EKG, Troponin and serial changes to determine if it is an Acute Myocardial Infarction or myocardial injury due to an underlying chronic condition.    PROTIME-INR - Normal    Narrative:      Therapeutic range for most indications is 2.0-3.0 INR,  or 2.5-3.5 for mechanical heart valves.   APTT - Normal    Narrative:     The recommended Heparin therapeutic range is 68-97 seconds.   D-DIMER, QUANTITATIVE - Normal    Narrative:     Dimer values <500 ng/ml FEU are FDA approved as aid in diagnosis of deep venous thrombosis and pulmonary embolism.  This test should not be used in an exclusion strategy with pretest probability alone.    A recent guideline regarding diagnosis for pulmonary thromboembolism recommends an adjusted exclusion criterion of age x 10 ng/ml FEU for patients >50 years of age (Deb Intern Med 2015; 163: 701-711).   CBC WITH AUTO DIFFERENTIAL - Normal   RAINBOW DRAW    Narrative:     The following orders were created for panel order [place] Draw.  Procedure                               Abnormality         Status                     ---------                               -----------         ------                     Light Blue Top[[id number]]                                   Final result               Green Top (Gel)[[id number]]                                  Final result               Lavender Top[[id number]]                                     Final result               Gold Top - SST[[id number]]                                   Final result                 Please view results for these tests on the individual orders.   HEMOGLOBIN A1C   LIPID PANEL   TSH   MAGNESIUM   BASIC METABOLIC PANEL   CBC WITH AUTO DIFFERENTIAL   CBC AND DIFFERENTIAL    Narrative:     The following orders were created for panel order CBC & Differential.  Procedure                               Abnormality         Status                     ---------                               -----------         ------                     CBC Auto Differential[[id number]]        Normal              Final result                 Please view results for these tests on the individual orders.   LIGHT BLUE TOP   GREEN TOP    LAVENDER TOP   [facility]     Xr Chest 2 View    Result Date: 9/1/2019  Narrative: PROCEDURE: XR CHEST 2 VW VIEWS: PA/Lat INDICATION: Chest pain COMPARISON: CXR: 7/10/2016 FINDINGS:   - lines/tubes: none   - cardiac: size within normal limits.   - mediastinum: contour within normal limits.   - lungs: no evidence of a focal air space process, pulmonary interstitial edema, nodule(s)/mass.   - pleura: no evidence of  fluid.    - osseous: unremarkable for age.     Impression: No acute cardiopulmonary process identified Electronically signed by:  Lenora Trevizo MD  9/1/2019 2:49 PM CDT Workstation: [id number]              [facility]      Final diagnoses:   NSTEMI (non-ST elevated myocardial infarction) (CMS/HCC)            Bennie Starkey MD  09/02/19 0056

## 2019-09-01 NOTE — PAYOR COMM NOTE
"Josee Jean Baptiste   Phone [phone]  Fax [phone]      Leeann Tsai (59 y.o. Female)     Date of Birth Social Security Number Address Home Phone MRN    1960  [address]   [facility] [address] [phone] [id number]    Holiness Marital Status          Rastafari        Admission Date Admission Type Admitting Provider Attending Provider Department, Room/Bed    9/1/19 Emergency Roni Pulido MD D[place], Sean M, MD [facility], 14/14    Discharge Date Discharge Disposition Discharge Destination                       Attending Provider:  Bennie Starkey MD    Allergies:  Biaxin [Clarithromycin], Penicillins    Isolation:  None   Infection:  None   Code Status:  CPR    Ht:  149.9 cm (59\")   Wt:  72.6 kg (160 lb 1.6 oz)    Admission Cmt:  None   Principal Problem:  None   NSTEMI 121.4             Active Insurance as of 9/1/2019     Primary Coverage     Payor Plan Insurance Group Employer/Plan Group    [employer] [place] [employer]      Payor Plan Address Payor Plan Phone Number Payor Plan Fax Number Effective Dates    [address] [address] [phone]  1/11/2018 - None Entered    [facility] [id number]       Subscriber Name Subscriber Birth Date Member ID       LEEANN TSAI 1960 [id number]                 Emergency Contacts      (Rel.) Home Phone Work Phone Mobile Phone    Alex Tsai (Spouse) [phone] -- [phone]    HARDIK PENNINGTON (Son) -- -- [phone]               History & Physical      Roni Pulido MD at 9/1/2019  4:39 PM              Hospitalist    Chief complaint: Chest pain    HPI: 59-year-old female with family history of premature heart disease in father came in with sudden onset of chest pain in retrosternal area started around noontime going through her back and jaw and neck area.  Moderate to severe in nature and intermittent and progressively getting worse.  Initially went to her PCPs " office but thinks she is having heart attack and came to ER.  Also having some tingling and numbness in fingers.  Feeling short of breath also.  No dizziness.  No syncopal episode.  No palpitations.  Did feel like going to break out in some sweats.  No leg swelling.  Came to ER and found to have non-ST elevation MI and admitted for further evaluation and treatment plan.        History  Past Medical History:   Diagnosis Date   • Arthritis    • Fibromyalgia    • Hypertension    Negative for diabetes, coronary artery disease or CVA.  No history of thyroid disorder.    Past surgical history: Negative and noncontributory  Family history: Positive for premature coronary artery disease and multiple MIs in father starting around age 45  History reviewed. No pertinent surgical history.  History reviewed. No pertinent family history.  Social History     Tobacco Use   • Smoking status: Never Smoker   • Smokeless tobacco: Never Used   Substance Use Topics   • Alcohol use: No   • Drug use: No   Tobacco use: Used to smoke some but quit 10 years ago    Trialing history: No recent domestic or international travel  Immunization: Could not tell    No current facility-administered medications on file prior to encounter.      Current Outpatient Medications on File Prior to Encounter   Medication Sig Dispense Refill   • HYDROcodone-acetaminophen (NORCO) 7.5-325 MG per tablet Take 1 tablet by mouth Every 6 (Six) Hours As Needed for Moderate Pain .     • omeprazole (priLOSEC) 40 MG capsule Take 40 mg by mouth Daily.     • propranolol (INDERAL) 10 MG tablet Take 10 mg by mouth 2 (Two) Times a Day.           (Not in a hospital admission)  Allergies:  Biaxin [clarithromycin] and Penicillins  Review of Systems   Review of Systems   Constitutional: Positive for diaphoresis. Negative for activity change, appetite change, chills, fatigue, fever and unexpected weight change.   HENT: Negative for congestion, ear discharge, postnasal drip,  rhinorrhea, sneezing and trouble swallowing.    Eyes: Negative for photophobia, pain, discharge, redness, itching and visual disturbance.   Respiratory: Positive for shortness of breath. Negative for apnea, cough, choking, chest tightness, wheezing and stridor.    Cardiovascular: Positive for chest pain. Negative for palpitations and leg swelling.   Gastrointestinal: Negative for abdominal distention, abdominal pain, anal bleeding, blood in stool, constipation, diarrhea, nausea and vomiting.   Endocrine: Negative for cold intolerance, heat intolerance, polydipsia, polyphagia and polyuria.   Genitourinary: Negative for difficulty urinating, dysuria, flank pain, frequency, hematuria, pelvic pain and vaginal bleeding.   Musculoskeletal: Positive for back pain. Negative for arthralgias, gait problem, joint swelling, myalgias, neck pain and neck stiffness.   Skin: Negative for color change, pallor, rash and wound.   Allergic/Immunologic: Negative for environmental allergies, food allergies and immunocompromised state.   Neurological: Positive for numbness. Negative for dizziness, tremors, seizures, syncope, facial asymmetry, speech difficulty, weakness, light-headedness and headaches.   Hematological: Negative for adenopathy. Does not bruise/bleed easily.   Psychiatric/Behavioral: Negative for agitation, behavioral problems, confusion, hallucinations, self-injury, sleep disturbance and suicidal ideas. The patient is not nervous/anxious.      Objective     Vital Signs  Heart Rate:  [58-76] 67  Resp:  [18-20] 18  BP: (139-180)/(77-91) 180/91    Physical Exam:    Physical Exam   Constitutional: She is oriented to person, place, and time. She appears well-developed and well-nourished. No distress.   HENT:   Head: Normocephalic and atraumatic.   Right Ear: External ear normal.   Left Ear: External ear normal.   Nose: Nose normal.   Mouth/Throat: Oropharynx is clear and moist. Dental caries present. No oropharyngeal exudate.    Several teeth are missing   Eyes: Conjunctivae and EOM are normal. Pupils are equal, round, and reactive to light. Right eye exhibits no discharge. Left eye exhibits no discharge. No scleral icterus.   Neck: Normal range of motion. Neck supple. No JVD present. No tracheal deviation present. No thyromegaly present.   Cardiovascular: Normal rate, regular rhythm, normal heart sounds and intact distal pulses. Exam reveals no gallop and no friction rub.   No murmur heard.  Pulmonary/Chest: Effort normal and breath sounds normal. No stridor. No respiratory distress. She has no wheezes. She has no rales. She exhibits no tenderness.   Abdominal: Soft. Bowel sounds are normal. She exhibits no distension and no mass. There is no tenderness. There is no rebound and no guarding. No hernia.   Musculoskeletal: Normal range of motion. She exhibits no edema, tenderness or deformity.   Lymphadenopathy:     She has no cervical adenopathy.   Neurological: She is alert and oriented to person, place, and time. She has normal reflexes. She displays normal reflexes. No cranial nerve deficit. She exhibits normal muscle tone. Coordination normal.   Skin: Skin is warm and dry. No rash noted. She is not diaphoretic. No erythema. No pallor.   Psychiatric: She has a normal mood and affect. Her behavior is normal. Judgment and thought content normal.         Results Review:      Lab Results (last 24 hours)     Procedure Component Value Units Date/Time    [place] Draw [[id number]] Collected:  09/01/19 1450    Specimen:  Blood Updated:  09/01/19 1600    Narrative:       The following orders were created for panel order [place] Draw.  Procedure                               Abnormality         Status                     ---------                               -----------         ------                     Light Blue Top[[id number]]                                   Final result               Green Top (Gel)[[id number]]                                   Final result               Lavender Top[[id number]]                                     Final result               Gold Top - SST[[id number]]                                   Final result                 Please view results for these tests on the individual orders.    Light Blue Top [[id number]] Collected:  09/01/19 1450    Specimen:  Blood Updated:  09/01/19 1600     Extra Tube hold for add-on     Comment: Auto resulted       Green Top (Gel) [[id number]] Collected:  09/01/19 1450    Specimen:  Blood Updated:  09/01/19 1600     Extra Tube Hold for add-ons.     Comment: Auto resulted.       Lavender Top [[id number]] Collected:  09/01/19 1450    Specimen:  Blood Updated:  09/01/19 1600     Extra Tube hold for add-on     Comment: Auto resulted       Gold Top - SST [[id number]] Collected:  09/01/19 1450    Specimen:  Blood Updated:  09/01/19 1600     Extra Tube Hold for add-ons.     Comment: Auto resulted.       Troponin [[id number]]  (Abnormal) Collected:  09/01/19 1450    Specimen:  Blood Updated:  09/01/19 1523     Troponin T 0.212 ng/mL     Narrative:       Troponin T Reference Range:  <= 0.03 ng/mL-   Negative for AMI  >0.03 ng/mL-     Abnormal for myocardial necrosis.  Clinicians would have to utilize clinical acumen, EKG, Troponin and serial changes to determine if it is an Acute Myocardial Infarction or myocardial injury due to an underlying chronic condition.     Basic Metabolic Panel [[id number]]  (Abnormal) Collected:  09/01/19 1450    Specimen:  Blood Updated:  09/01/19 1522     Glucose 105 mg/dL      BUN 6 mg/dL      Creatinine 0.66 mg/dL      Sodium 143 mmol/L      Potassium 3.0 mmol/L      Chloride 102 mmol/L      CO2 27.0 mmol/L      Calcium 9.0 mg/dL      eGFR Non African Amer 92 mL/min/1.73      BUN/Creatinine Ratio 9.1     Anion Gap 14.0 mmol/L     Narrative:       GFR Normal >60  Chronic Kidney Disease <60  Kidney Failure <15    D-dimer, Quantitative [[id number]]  (Normal) Collected:  09/01/19 1450     Specimen:  Blood Updated:  09/01/19 1514     D-Dimer, Quantitative 321 ng/mL (FEU)     Narrative:       Dimer values <500 ng/ml FEU are FDA approved as aid in diagnosis of deep venous thrombosis and pulmonary embolism.  This test should not be used in an exclusion strategy with pretest probability alone.    A recent guideline regarding diagnosis for pulmonary thromboembolism recommends an adjusted exclusion criterion of age x 10 ng/ml FEU for patients >50 years of age (Deb Intern Med 2015; 163: 701-711).    Protime-INR [[id number]]  (Normal) Collected:  09/01/19 1450    Specimen:  Blood Updated:  09/01/19 1513     Protime 13.3 Seconds      INR 1.03    Narrative:       Therapeutic range for most indications is 2.0-3.0 INR,  or 2.5-3.5 for mechanical heart valves.    aPTT [[id number]]  (Normal) Collected:  09/01/19 1450    Specimen:  Blood Updated:  09/01/19 1513     PTT 31.4 seconds     Narrative:       The recommended Heparin therapeutic range is 68-97 seconds.    CBC & Differential [[id number]] Collected:  09/01/19 1450    Specimen:  Blood Updated:  09/01/19 1455    Narrative:       The following orders were created for panel order CBC & Differential.  Procedure                               Abnormality         Status                     ---------                               -----------         ------                     CBC Auto Differential[[id number]]        Normal              Final result                 Please view results for these tests on the individual orders.    CBC Auto Differential [[id number]]  (Normal) Collected:  09/01/19 1450    Specimen:  Blood Updated:  09/01/19 1455     WBC 9.25 10*3/mm3      RBC 4.67 10*6/mm3      Hemoglobin 12.7 g/dL      Hematocrit 36.9 %      MCV 79.0 fL      MCH 27.2 pg      MCHC 34.4 g/dL      RDW 13.8 %      RDW-SD 39.6 fl      MPV 10.1 fL      Platelets 293 10*3/mm3      Neutrophil % 62.1 %      Lymphocyte % 27.9 %      Monocyte % 6.3 %      Eosinophil % 3.1 %      Basophil  % 0.3 %      Immature Grans % 0.3 %      Neutrophils, Absolute 5.74 10*3/mm3      Lymphocytes, Absolute 2.58 10*3/mm3      Monocytes, Absolute 0.58 10*3/mm3      Eosinophils, Absolute 0.29 10*3/mm3      Basophils, Absolute 0.03 10*3/mm3      Immature Grans, Absolute 0.03 10*3/mm3      nRBC 0.0 /100 WBC         Imaging Results (last 24 hours)     Procedure Component Value Units Date/Time    XR Chest 2 View [[id number]] Collected:  09/01/19 1408     Updated:  09/01/19 1450    Narrative:       PROCEDURE: XR CHEST 2 VW    VIEWS: PA/Lat    INDICATION: Chest pain    COMPARISON: CXR: 7/10/2016    FINDINGS:       - lines/tubes: none    - cardiac: size within normal limits.    - mediastinum: contour within normal limits.     - lungs: no evidence of a focal air space process, pulmonary  interstitial edema, nodule(s)/mass.     - pleura: no evidence of  fluid.      - osseous: unremarkable for age.      Impression:       No acute cardiopulmonary process identified    Electronically signed by:  Lenora Trevizo MD  9/1/2019 2:49 PM CDT  Workstation: [id number]        EKG: #1 Normal sinus rhythm  Possible Left atrial enlargement  Prolonged QT  Abnormal ECG    EKG #2: Normal sinus rhythm  Possible Left atrial enlargement, T inversion in aVL  Borderline ECG      Assessment/Plan:    1.  Acute non-ST elevation MI, with elevated troponin and EKG changes, family history positive for premature heart disease in father  - Admit to CCU or cardiac stepdown  -Trend troponin  -Telemetry  - IV nitroglycerin  -Lovenox  -Plavix and aspirin given in ER  -Consult cardiology, informed form ER  -TSH  -Hemoglobin A1c  -Fasting lipid profile  -O2 as needed  -Cardiac diet    2.  Hypokalemia  - Replacement given in ER  -Replace with K mag protocol    3.  Chronic backache with dependency on narcotics pain medicine  -Continue home Norco twice daily as needed    Home medicines reviewed and reconciled.  Restarted appropriate medicines.        Length of stay: More  than 2 midnights  DVT prophylaxis: Lovenox  Disposition: Depending upon clinical course  CODE STATUS: Full code  Reviewed old records.  Discussed with ER physician.  Spoke with nursing staff in ER.    I discussed the patients findings and my recommendations with patient and her significant other at bedside.  Critical care time spent 60 minutes or more.  Roni Pulido MD  09/01/19  4:39 PM    After I saw her I was called back to ER in the emergency due to patient's blood pressure dropped down.  When I saw patient earlier, patient started having some itching related to Lovenox but did not have rash and given Benadryl 12.5 mg IV.  After that patient suddenly started feeling sleepy and then her blood pressure dropped down into 70s so nitroglycerin drip was stopped.  And her systolic blood pressure came up to 97 and then went up to 103.  Patient chest pain is doing better.  No shortness of breath.  Patient does have some nausea also given Zofran.  Electrocardiogram repeated.    EKG:Normal sinus rhythm  Possible Left atrial enlargement  Prolonged QT  Abnormal ECG, prolonged QT interval is new as compared to prior EKG.  We will fax EKG to Dr. Odonnell.  Patient is stable now so will admit to CCU and monitor.    Electronically signed by Roni Pulido MD at 9/1/2019  5:10 PM          Emergency Department Notes      Capri Valladares RN at 9/1/2019  3:03 PM        Assisted patient to restroom. Pt tolerated well.      Capri Valladares RN  09/01/19 1503      Electronically signed by Capri Valladares RN at 9/1/2019  3:03 PM       Physician Progress Notes (all)     No notes of this type exist for this encounter.        Consult Notes (all)     No notes of this type exist for this encounter.

## 2019-09-01 NOTE — H&P
Hospitalist    Chief complaint: Chest pain    HPI: 59-year-old female with family history of premature heart disease in father came in with sudden onset of chest pain in retrosternal area started around noontime going through her back and jaw and neck area.  Moderate to severe in nature and intermittent and progressively getting worse.  Initially went to her PCPs office but thinks she is having heart attack and came to ER.  Also having some tingling and numbness in fingers.  Feeling short of breath also.  No dizziness.  No syncopal episode.  No palpitations.  Did feel like going to break out in some sweats.  No leg swelling.  Came to ER and found to have non-ST elevation MI and admitted for further evaluation and treatment plan.        History  Past Medical History:   Diagnosis Date   • Arthritis    • Fibromyalgia    • Hypertension    Negative for diabetes, coronary artery disease or CVA.  No history of thyroid disorder.    Past surgical history: Negative and noncontributory  Family history: Positive for premature coronary artery disease and multiple MIs in father starting around age 45  History reviewed. No pertinent surgical history.  History reviewed. No pertinent family history.  Social History     Tobacco Use   • Smoking status: Never Smoker   • Smokeless tobacco: Never Used   Substance Use Topics   • Alcohol use: No   • Drug use: No   Tobacco use: Used to smoke some but quit 10 years ago    Trialing history: No recent domestic or international travel  Immunization: Could not tell    No current facility-administered medications on file prior to encounter.      Current Outpatient Medications on File Prior to Encounter   Medication Sig Dispense Refill   • HYDROcodone-acetaminophen (NORCO) 7.5-325 MG per tablet Take 1 tablet by mouth Every 6 (Six) Hours As Needed for Moderate Pain .     • omeprazole (priLOSEC) 40 MG capsule Take 40 mg by mouth Daily.     • propranolol (INDERAL) 10 MG tablet Take 10 mg by mouth  2 (Two) Times a Day.           (Not in a hospital admission)  Allergies:  Biaxin [clarithromycin] and Penicillins  Review of Systems   Review of Systems   Constitutional: Positive for diaphoresis. Negative for activity change, appetite change, chills, fatigue, fever and unexpected weight change.   HENT: Negative for congestion, ear discharge, postnasal drip, rhinorrhea, sneezing and trouble swallowing.    Eyes: Negative for photophobia, pain, discharge, redness, itching and visual disturbance.   Respiratory: Positive for shortness of breath. Negative for apnea, cough, choking, chest tightness, wheezing and stridor.    Cardiovascular: Positive for chest pain. Negative for palpitations and leg swelling.   Gastrointestinal: Negative for abdominal distention, abdominal pain, anal bleeding, blood in stool, constipation, diarrhea, nausea and vomiting.   Endocrine: Negative for cold intolerance, heat intolerance, polydipsia, polyphagia and polyuria.   Genitourinary: Negative for difficulty urinating, dysuria, flank pain, frequency, hematuria, pelvic pain and vaginal bleeding.   Musculoskeletal: Positive for back pain. Negative for arthralgias, gait problem, joint swelling, myalgias, neck pain and neck stiffness.   Skin: Negative for color change, pallor, rash and wound.   Allergic/Immunologic: Negative for environmental allergies, food allergies and immunocompromised state.   Neurological: Positive for numbness. Negative for dizziness, tremors, seizures, syncope, facial asymmetry, speech difficulty, weakness, light-headedness and headaches.   Hematological: Negative for adenopathy. Does not bruise/bleed easily.   Psychiatric/Behavioral: Negative for agitation, behavioral problems, confusion, hallucinations, self-injury, sleep disturbance and suicidal ideas. The patient is not nervous/anxious.      Objective     Vital Signs  Heart Rate:  [58-76] 67  Resp:  [18-20] 18  BP: (139-180)/(77-91) 180/91    Physical Exam:     Physical Exam   Constitutional: She is oriented to person, place, and time. She appears well-developed and well-nourished. No distress.   HENT:   Head: Normocephalic and atraumatic.   Right Ear: External ear normal.   Left Ear: External ear normal.   Nose: Nose normal.   Mouth/Throat: Oropharynx is clear and moist. Dental caries present. No oropharyngeal exudate.   Several teeth are missing   Eyes: Conjunctivae and EOM are normal. Pupils are equal, round, and reactive to light. Right eye exhibits no discharge. Left eye exhibits no discharge. No scleral icterus.   Neck: Normal range of motion. Neck supple. No JVD present. No tracheal deviation present. No thyromegaly present.   Cardiovascular: Normal rate, regular rhythm, normal heart sounds and intact distal pulses. Exam reveals no gallop and no friction rub.   No murmur heard.  Pulmonary/Chest: Effort normal and breath sounds normal. No stridor. No respiratory distress. She has no wheezes. She has no rales. She exhibits no tenderness.   Abdominal: Soft. Bowel sounds are normal. She exhibits no distension and no mass. There is no tenderness. There is no rebound and no guarding. No hernia.   Musculoskeletal: Normal range of motion. She exhibits no edema, tenderness or deformity.   Lymphadenopathy:     She has no cervical adenopathy.   Neurological: She is alert and oriented to person, place, and time. She has normal reflexes. She displays normal reflexes. No cranial nerve deficit. She exhibits normal muscle tone. Coordination normal.   Skin: Skin is warm and dry. No rash noted. She is not diaphoretic. No erythema. No pallor.   Psychiatric: She has a normal mood and affect. Her behavior is normal. Judgment and thought content normal.         Results Review:      Lab Results (last 24 hours)     Procedure Component Value Units Date/Time    [place] Draw [[id number]] Collected:  09/01/19 1450    Specimen:  Blood Updated:  09/01/19 1600    Narrative:       The following  orders were created for panel order [place] Draw.  Procedure                               Abnormality         Status                     ---------                               -----------         ------                     Light Blue Top[[id number]]                                   Final result               Green Top (Gel)[[id number]]                                  Final result               Lavender Top[[id number]]                                     Final result               Gold Top - SST[[id number]]                                   Final result                 Please view results for these tests on the individual orders.    Light Blue Top [[id number]] Collected:  09/01/19 1450    Specimen:  Blood Updated:  09/01/19 1600     Extra Tube hold for add-on     Comment: Auto resulted       Green Top (Gel) [[id number]] Collected:  09/01/19 1450    Specimen:  Blood Updated:  09/01/19 1600     Extra Tube Hold for add-ons.     Comment: Auto resulted.       Lavender Top [[id number]] Collected:  09/01/19 1450    Specimen:  Blood Updated:  09/01/19 1600     Extra Tube hold for add-on     Comment: Auto resulted       Gold Top - SST [[id number]] Collected:  09/01/19 1450    Specimen:  Blood Updated:  09/01/19 1600     Extra Tube Hold for add-ons.     Comment: Auto resulted.       Troponin [[id number]]  (Abnormal) Collected:  09/01/19 1450    Specimen:  Blood Updated:  09/01/19 1523     Troponin T 0.212 ng/mL     Narrative:       Troponin T Reference Range:  <= 0.03 ng/mL-   Negative for AMI  >0.03 ng/mL-     Abnormal for myocardial necrosis.  Clinicians would have to utilize clinical acumen, EKG, Troponin and serial changes to determine if it is an Acute Myocardial Infarction or myocardial injury due to an underlying chronic condition.     Basic Metabolic Panel [[id number]]  (Abnormal) Collected:  09/01/19 1450    Specimen:  Blood Updated:  09/01/19 1522     Glucose 105 mg/dL      BUN 6 mg/dL      Creatinine 0.66 mg/dL       Sodium 143 mmol/L      Potassium 3.0 mmol/L      Chloride 102 mmol/L      CO2 27.0 mmol/L      Calcium 9.0 mg/dL      eGFR Non African Amer 92 mL/min/1.73      BUN/Creatinine Ratio 9.1     Anion Gap 14.0 mmol/L     Narrative:       GFR Normal >60  Chronic Kidney Disease <60  Kidney Failure <15    D-dimer, Quantitative [[id number]]  (Normal) Collected:  09/01/19 1450    Specimen:  Blood Updated:  09/01/19 1514     D-Dimer, Quantitative 321 ng/mL (FEU)     Narrative:       Dimer values <500 ng/ml FEU are FDA approved as aid in diagnosis of deep venous thrombosis and pulmonary embolism.  This test should not be used in an exclusion strategy with pretest probability alone.    A recent guideline regarding diagnosis for pulmonary thromboembolism recommends an adjusted exclusion criterion of age x 10 ng/ml FEU for patients >50 years of age (Deb Intern Med 2015; 163: 701-711).    Protime-INR [[id number]]  (Normal) Collected:  09/01/19 1450    Specimen:  Blood Updated:  09/01/19 1513     Protime 13.3 Seconds      INR 1.03    Narrative:       Therapeutic range for most indications is 2.0-3.0 INR,  or 2.5-3.5 for mechanical heart valves.    aPTT [[id number]]  (Normal) Collected:  09/01/19 1450    Specimen:  Blood Updated:  09/01/19 1513     PTT 31.4 seconds     Narrative:       The recommended Heparin therapeutic range is 68-97 seconds.    CBC & Differential [[id number]] Collected:  09/01/19 1450    Specimen:  Blood Updated:  09/01/19 1455    Narrative:       The following orders were created for panel order CBC & Differential.  Procedure                               Abnormality         Status                     ---------                               -----------         ------                     CBC Auto Differential[[id number]]        Normal              Final result                 Please view results for these tests on the individual orders.    CBC Auto Differential [[id number]]  (Normal) Collected:  09/01/19 1450     Specimen:  Blood Updated:  09/01/19 1455     WBC 9.25 10*3/mm3      RBC 4.67 10*6/mm3      Hemoglobin 12.7 g/dL      Hematocrit 36.9 %      MCV 79.0 fL      MCH 27.2 pg      MCHC 34.4 g/dL      RDW 13.8 %      RDW-SD 39.6 fl      MPV 10.1 fL      Platelets 293 10*3/mm3      Neutrophil % 62.1 %      Lymphocyte % 27.9 %      Monocyte % 6.3 %      Eosinophil % 3.1 %      Basophil % 0.3 %      Immature Grans % 0.3 %      Neutrophils, Absolute 5.74 10*3/mm3      Lymphocytes, Absolute 2.58 10*3/mm3      Monocytes, Absolute 0.58 10*3/mm3      Eosinophils, Absolute 0.29 10*3/mm3      Basophils, Absolute 0.03 10*3/mm3      Immature Grans, Absolute 0.03 10*3/mm3      nRBC 0.0 /100 WBC         Imaging Results (last 24 hours)     Procedure Component Value Units Date/Time    XR Chest 2 View [[id number]] Collected:  09/01/19 1408     Updated:  09/01/19 1450    Narrative:       PROCEDURE: XR CHEST 2 VW    VIEWS: PA/Lat    INDICATION: Chest pain    COMPARISON: CXR: 7/10/2016    FINDINGS:       - lines/tubes: none    - cardiac: size within normal limits.    - mediastinum: contour within normal limits.     - lungs: no evidence of a focal air space process, pulmonary  interstitial edema, nodule(s)/mass.     - pleura: no evidence of  fluid.      - osseous: unremarkable for age.      Impression:       No acute cardiopulmonary process identified    Electronically signed by:  Lenora Trevizo MD  9/1/2019 2:49 PM CDT  Workstation: [id number]        EKG: #1 Normal sinus rhythm  Possible Left atrial enlargement  Prolonged QT  Abnormal ECG    EKG #2: Normal sinus rhythm  Possible Left atrial enlargement, T inversion in aVL  Borderline ECG      Assessment/Plan:    1.  Acute non-ST elevation MI, with elevated troponin and EKG changes, family history positive for premature heart disease in father  - Admit to CCU or cardiac stepdown  -Trend troponin  -Telemetry  - IV nitroglycerin  -Lovenox  -Plavix and aspirin given in ER  -Consult cardiology,  informed form ER  -TSH  -Hemoglobin A1c  -Fasting lipid profile  -O2 as needed  -Cardiac diet    2.  Hypokalemia  - Replacement given in ER  -Replace with K mag protocol    3.  Chronic backache with dependency on narcotics pain medicine  -Continue home Norco twice daily as needed    Home medicines reviewed and reconciled.  Restarted appropriate medicines.        Length of stay: More than 2 midnights  DVT prophylaxis: Lovenox  Disposition: Depending upon clinical course  CODE STATUS: Full code  Reviewed old records.  Discussed with ER physician.  Spoke with nursing staff in ER.    I discussed the patients findings and my recommendations with patient and her significant other at bedside.  Critical care time spent 60 minutes or more.  Roni Pulido MD  09/01/19  4:39 PM    After I saw her I was called back to ER in the emergency due to patient's blood pressure dropped down.  When I saw patient earlier, patient started having some itching related to Lovenox but did not have rash and given Benadryl 12.5 mg IV.  After that patient suddenly started feeling sleepy and then her blood pressure dropped down into 70s so nitroglycerin drip was stopped.  And her systolic blood pressure came up to 97 and then went up to 103.  Patient chest pain is doing better.  No shortness of breath.  Patient does have some nausea also given Zofran.  Electrocardiogram repeated.    EKG:Normal sinus rhythm  Possible Left atrial enlargement  Prolonged QT  Abnormal ECG, prolonged QT interval is new as compared to prior EKG.  We will fax EKG to Dr. Odonnell.  Patient is stable now so will admit to CCU and monitor.

## 2019-09-01 NOTE — ED NOTES
notified of troponin.  He said to call Dr. Goldstein. Dr. Triston mario.      Capri Valladares RN  09/01/19 2815

## 2019-09-02 ENCOUNTER — PREP FOR SURGERY (OUTPATIENT)
Dept: OTHER | Facility: HOSPITAL | Age: 59
End: 2019-09-02

## 2019-09-02 ENCOUNTER — APPOINTMENT (OUTPATIENT)
Dept: CARDIOLOGY | Facility: HOSPITAL | Age: 59
End: 2019-09-02

## 2019-09-02 DIAGNOSIS — I21.4 NSTEMI (NON-ST ELEVATED MYOCARDIAL INFARCTION) (HCC): Primary | ICD-10-CM

## 2019-09-02 LAB
ANION GAP SERPL CALCULATED.3IONS-SCNC: 12 MMOL/L (ref 5–15)
BASOPHILS # BLD AUTO: 0.03 10*3/MM3 (ref 0–0.2)
BASOPHILS NFR BLD AUTO: 0.3 % (ref 0–1.5)
BH CV ECHO MEAS - ACS: 1.7 CM
BH CV ECHO MEAS - AO MAX PG (FULL): 2.2 MMHG
BH CV ECHO MEAS - AO MAX PG: 8.6 MMHG
BH CV ECHO MEAS - AO MEAN PG (FULL): 1 MMHG
BH CV ECHO MEAS - AO MEAN PG: 5 MMHG
BH CV ECHO MEAS - AO ROOT AREA (BSA CORRECTED): 1.6
BH CV ECHO MEAS - AO ROOT AREA: 5.7 CM^2
BH CV ECHO MEAS - AO ROOT DIAM: 2.7 CM
BH CV ECHO MEAS - AO V2 MAX: 147 CM/SEC
BH CV ECHO MEAS - AO V2 MEAN: 98.7 CM/SEC
BH CV ECHO MEAS - AO V2 VTI: 27.6 CM
BH CV ECHO MEAS - ASC AORTA: 3.3 CM
BH CV ECHO MEAS - AVA(I,A): 2.7 CM^2
BH CV ECHO MEAS - AVA(I,D): 2.7 CM^2
BH CV ECHO MEAS - AVA(V,A): 2.4 CM^2
BH CV ECHO MEAS - AVA(V,D): 2.4 CM^2
BH CV ECHO MEAS - BSA(HAYCOCK): 1.8 M^2
BH CV ECHO MEAS - BSA: 1.7 M^2
BH CV ECHO MEAS - BZI_BMI: 32.3 KILOGRAMS/M^2
BH CV ECHO MEAS - BZI_METRIC_HEIGHT: 149.9 CM
BH CV ECHO MEAS - BZI_METRIC_WEIGHT: 72.6 KG
BH CV ECHO MEAS - EDV(CUBED): 64.5 ML
BH CV ECHO MEAS - EDV(TEICH): 70.4 ML
BH CV ECHO MEAS - EF(CUBED): 49.7 %
BH CV ECHO MEAS - EF(TEICH): 42.3 %
BH CV ECHO MEAS - ESV(CUBED): 32.5 ML
BH CV ECHO MEAS - ESV(TEICH): 40.6 ML
BH CV ECHO MEAS - FS: 20.4 %
BH CV ECHO MEAS - IVS/LVPW: 1.1
BH CV ECHO MEAS - IVSD: 1.1 CM
BH CV ECHO MEAS - LA DIMENSION: 3.5 CM
BH CV ECHO MEAS - LA/AO: 1.3
BH CV ECHO MEAS - LV MASS(C)D: 131.9 GRAMS
BH CV ECHO MEAS - LV MASS(C)DI: 78.7 GRAMS/M^2
BH CV ECHO MEAS - LV MAX PG: 6.5 MMHG
BH CV ECHO MEAS - LV MEAN PG: 4 MMHG
BH CV ECHO MEAS - LV V1 MAX: 127 CM/SEC
BH CV ECHO MEAS - LV V1 MEAN: 91.1 CM/SEC
BH CV ECHO MEAS - LV V1 VTI: 26.1 CM
BH CV ECHO MEAS - LVIDD: 4 CM
BH CV ECHO MEAS - LVIDS: 3.2 CM
BH CV ECHO MEAS - LVOT AREA (M): 2.8 CM^2
BH CV ECHO MEAS - LVOT AREA: 2.8 CM^2
BH CV ECHO MEAS - LVOT DIAM: 1.9 CM
BH CV ECHO MEAS - LVPWD: 0.99 CM
BH CV ECHO MEAS - MR MAX PG: 76 MMHG
BH CV ECHO MEAS - MR MAX VEL: 436 CM/SEC
BH CV ECHO MEAS - MV A MAX VEL: 157 CM/SEC
BH CV ECHO MEAS - MV DEC SLOPE: 872 CM/SEC^2
BH CV ECHO MEAS - MV E MAX VEL: 113 CM/SEC
BH CV ECHO MEAS - MV E/A: 0.72
BH CV ECHO MEAS - MV P1/2T MAX VEL: 147 CM/SEC
BH CV ECHO MEAS - MV P1/2T: 49.4 MSEC
BH CV ECHO MEAS - MVA P1/2T LCG: 1.5 CM^2
BH CV ECHO MEAS - MVA(P1/2T): 4.5 CM^2
BH CV ECHO MEAS - PA MAX PG: 6 MMHG
BH CV ECHO MEAS - PA V2 MAX: 122 CM/SEC
BH CV ECHO MEAS - RAP SYSTOLE: 5 MMHG
BH CV ECHO MEAS - RVDD: 2.7 CM
BH CV ECHO MEAS - RVSP: 20.7 MMHG
BH CV ECHO MEAS - SI(AO): 94.2 ML/M^2
BH CV ECHO MEAS - SI(CUBED): 19.1 ML/M^2
BH CV ECHO MEAS - SI(LVOT): 44.1 ML/M^2
BH CV ECHO MEAS - SI(TEICH): 17.7 ML/M^2
BH CV ECHO MEAS - SV(AO): 158 ML
BH CV ECHO MEAS - SV(CUBED): 32 ML
BH CV ECHO MEAS - SV(LVOT): 74 ML
BH CV ECHO MEAS - SV(TEICH): 29.8 ML
BH CV ECHO MEAS - TR MAX VEL: 198 CM/SEC
BUN BLD-MCNC: 6 MG/DL (ref 6–20)
BUN/CREAT SERPL: 9.8 (ref 7–25)
CALCIUM SPEC-SCNC: 7.6 MG/DL (ref 8.6–10.5)
CHLORIDE SERPL-SCNC: 108 MMOL/L (ref 98–107)
CHOLEST SERPL-MCNC: 129 MG/DL (ref 0–200)
CO2 SERPL-SCNC: 25 MMOL/L (ref 22–29)
CREAT BLD-MCNC: 0.61 MG/DL (ref 0.57–1)
DEPRECATED RDW RBC AUTO: 42 FL (ref 37–54)
EOSINOPHIL # BLD AUTO: 0.41 10*3/MM3 (ref 0–0.4)
EOSINOPHIL NFR BLD AUTO: 4.6 % (ref 0.3–6.2)
ERYTHROCYTE [DISTWIDTH] IN BLOOD BY AUTOMATED COUNT: 14.4 % (ref 12.3–15.4)
GFR SERPL CREATININE-BSD FRML MDRD: 101 ML/MIN/1.73
GLUCOSE BLD-MCNC: 120 MG/DL (ref 65–99)
HBA1C MFR BLD: 5.1 % (ref 4.8–5.6)
HCT VFR BLD AUTO: 33.5 % (ref 34–46.6)
HDLC SERPL-MCNC: 48 MG/DL (ref 40–60)
HGB BLD-MCNC: 11.3 G/DL (ref 12–15.9)
IMM GRANULOCYTES # BLD AUTO: 0.02 10*3/MM3 (ref 0–0.05)
IMM GRANULOCYTES NFR BLD AUTO: 0.2 % (ref 0–0.5)
LDLC SERPL CALC-MCNC: 62 MG/DL (ref 0–100)
LDLC/HDLC SERPL: 1.29 {RATIO}
LV EF 2D ECHO EST: 43 %
LYMPHOCYTES # BLD AUTO: 2.89 10*3/MM3 (ref 0.7–3.1)
LYMPHOCYTES NFR BLD AUTO: 32.4 % (ref 19.6–45.3)
MAGNESIUM SERPL-MCNC: 1.7 MG/DL (ref 1.6–2.6)
MAXIMAL PREDICTED HEART RATE: 163 BPM
MCH RBC QN AUTO: 27.6 PG (ref 26.6–33)
MCHC RBC AUTO-ENTMCNC: 33.7 G/DL (ref 31.5–35.7)
MCV RBC AUTO: 81.7 FL (ref 79–97)
MONOCYTES # BLD AUTO: 0.55 10*3/MM3 (ref 0.1–0.9)
MONOCYTES NFR BLD AUTO: 6.2 % (ref 5–12)
NEUTROPHILS # BLD AUTO: 5.02 10*3/MM3 (ref 1.7–7)
NEUTROPHILS NFR BLD AUTO: 56.3 % (ref 42.7–76)
NRBC BLD AUTO-RTO: 0 /100 WBC (ref 0–0.2)
PLATELET # BLD AUTO: 226 10*3/MM3 (ref 140–450)
PMV BLD AUTO: 10.1 FL (ref 6–12)
POTASSIUM BLD-SCNC: 3.3 MMOL/L (ref 3.5–5.2)
POTASSIUM BLD-SCNC: 3.7 MMOL/L (ref 3.5–5.2)
RBC # BLD AUTO: 4.1 10*6/MM3 (ref 3.77–5.28)
SODIUM BLD-SCNC: 145 MMOL/L (ref 136–145)
STRESS TARGET HR: 139 BPM
T4 FREE SERPL-MCNC: 0.85 NG/DL (ref 0.93–1.7)
TRIGL SERPL-MCNC: 95 MG/DL (ref 0–150)
TSH SERPL DL<=0.05 MIU/L-ACNC: 4.26 UIU/ML (ref 0.27–4.2)
VLDLC SERPL-MCNC: 19 MG/DL
WBC NRBC COR # BLD: 8.92 10*3/MM3 (ref 3.4–10.8)

## 2019-09-02 PROCEDURE — B2111ZZ FLUOROSCOPY OF MULTIPLE CORONARY ARTERIES USING LOW OSMOLAR CONTRAST: ICD-10-PCS | Performed by: INTERNAL MEDICINE

## 2019-09-02 PROCEDURE — C1894 INTRO/SHEATH, NON-LASER: HCPCS | Performed by: INTERNAL MEDICINE

## 2019-09-02 PROCEDURE — 93458 L HRT ARTERY/VENTRICLE ANGIO: CPT | Performed by: INTERNAL MEDICINE

## 2019-09-02 PROCEDURE — 83036 HEMOGLOBIN GLYCOSYLATED A1C: CPT | Performed by: INTERNAL MEDICINE

## 2019-09-02 PROCEDURE — 4A023N7 MEASUREMENT OF CARDIAC SAMPLING AND PRESSURE, LEFT HEART, PERCUTANEOUS APPROACH: ICD-10-PCS | Performed by: INTERNAL MEDICINE

## 2019-09-02 PROCEDURE — 84443 ASSAY THYROID STIM HORMONE: CPT | Performed by: INTERNAL MEDICINE

## 2019-09-02 PROCEDURE — 94760 N-INVAS EAR/PLS OXIMETRY 1: CPT

## 2019-09-02 PROCEDURE — 25010000002 FENTANYL CITRATE (PF) 100 MCG/2ML SOLUTION: Performed by: INTERNAL MEDICINE

## 2019-09-02 PROCEDURE — C1769 GUIDE WIRE: HCPCS | Performed by: INTERNAL MEDICINE

## 2019-09-02 PROCEDURE — C1760 CLOSURE DEV, VASC: HCPCS | Performed by: INTERNAL MEDICINE

## 2019-09-02 PROCEDURE — 84132 ASSAY OF SERUM POTASSIUM: CPT | Performed by: INTERNAL MEDICINE

## 2019-09-02 PROCEDURE — 99254 IP/OBS CNSLTJ NEW/EST MOD 60: CPT | Performed by: INTERNAL MEDICINE

## 2019-09-02 PROCEDURE — B2151ZZ FLUOROSCOPY OF LEFT HEART USING LOW OSMOLAR CONTRAST: ICD-10-PCS | Performed by: INTERNAL MEDICINE

## 2019-09-02 PROCEDURE — 93306 TTE W/DOPPLER COMPLETE: CPT

## 2019-09-02 PROCEDURE — 80048 BASIC METABOLIC PNL TOTAL CA: CPT | Performed by: INTERNAL MEDICINE

## 2019-09-02 PROCEDURE — 0 IOPAMIDOL PER 1 ML: Performed by: INTERNAL MEDICINE

## 2019-09-02 PROCEDURE — 94799 UNLISTED PULMONARY SVC/PX: CPT

## 2019-09-02 PROCEDURE — 84439 ASSAY OF FREE THYROXINE: CPT | Performed by: INTERNAL MEDICINE

## 2019-09-02 PROCEDURE — 83735 ASSAY OF MAGNESIUM: CPT | Performed by: INTERNAL MEDICINE

## 2019-09-02 PROCEDURE — 85025 COMPLETE CBC W/AUTO DIFF WBC: CPT | Performed by: INTERNAL MEDICINE

## 2019-09-02 PROCEDURE — 25010000002 MIDAZOLAM PER 1 MG: Performed by: INTERNAL MEDICINE

## 2019-09-02 PROCEDURE — 93306 TTE W/DOPPLER COMPLETE: CPT | Performed by: INTERNAL MEDICINE

## 2019-09-02 PROCEDURE — 80061 LIPID PANEL: CPT | Performed by: INTERNAL MEDICINE

## 2019-09-02 RX ORDER — LOSARTAN POTASSIUM 25 MG/1
25 TABLET ORAL DAILY
Status: DISCONTINUED | OUTPATIENT
Start: 2019-09-02 | End: 2019-09-04 | Stop reason: HOSPADM

## 2019-09-02 RX ORDER — MIDAZOLAM HYDROCHLORIDE 1 MG/ML
INJECTION INTRAMUSCULAR; INTRAVENOUS AS NEEDED
Status: DISCONTINUED | OUTPATIENT
Start: 2019-09-02 | End: 2019-09-02 | Stop reason: HOSPADM

## 2019-09-02 RX ORDER — SODIUM CHLORIDE 9 MG/ML
125 INJECTION, SOLUTION INTRAVENOUS CONTINUOUS
Status: DISCONTINUED | OUTPATIENT
Start: 2019-09-02 | End: 2019-09-02

## 2019-09-02 RX ORDER — SODIUM CHLORIDE 0.9 % (FLUSH) 0.9 %
3 SYRINGE (ML) INJECTION EVERY 12 HOURS SCHEDULED
Status: CANCELLED | OUTPATIENT
Start: 2019-09-02

## 2019-09-02 RX ORDER — FENTANYL CITRATE 50 UG/ML
INJECTION, SOLUTION INTRAMUSCULAR; INTRAVENOUS AS NEEDED
Status: DISCONTINUED | OUTPATIENT
Start: 2019-09-02 | End: 2019-09-02 | Stop reason: HOSPADM

## 2019-09-02 RX ORDER — SODIUM CHLORIDE 9 MG/ML
100 INJECTION, SOLUTION INTRAVENOUS CONTINUOUS
Status: DISPENSED | OUTPATIENT
Start: 2019-09-02 | End: 2019-09-02

## 2019-09-02 RX ORDER — ATORVASTATIN CALCIUM 20 MG/1
20 TABLET, FILM COATED ORAL NIGHTLY
Status: DISCONTINUED | OUTPATIENT
Start: 2019-09-02 | End: 2019-09-04 | Stop reason: HOSPADM

## 2019-09-02 RX ORDER — SODIUM CHLORIDE 0.9 % (FLUSH) 0.9 %
10 SYRINGE (ML) INJECTION AS NEEDED
Status: DISCONTINUED | OUTPATIENT
Start: 2019-09-02 | End: 2019-09-02 | Stop reason: HOSPADM

## 2019-09-02 RX ORDER — SODIUM CHLORIDE 0.9 % (FLUSH) 0.9 %
3 SYRINGE (ML) INJECTION EVERY 12 HOURS SCHEDULED
Status: DISCONTINUED | OUTPATIENT
Start: 2019-09-02 | End: 2019-09-02 | Stop reason: HOSPADM

## 2019-09-02 RX ORDER — ASPIRIN 81 MG/1
81 TABLET ORAL DAILY
Status: DISCONTINUED | OUTPATIENT
Start: 2019-09-02 | End: 2019-09-04 | Stop reason: HOSPADM

## 2019-09-02 RX ORDER — POTASSIUM CHLORIDE 1.5 G/1.77G
40 POWDER, FOR SOLUTION ORAL AS NEEDED
Status: DISCONTINUED | OUTPATIENT
Start: 2019-09-02 | End: 2019-09-04 | Stop reason: HOSPADM

## 2019-09-02 RX ORDER — LIDOCAINE HYDROCHLORIDE 20 MG/ML
INJECTION, SOLUTION INFILTRATION; PERINEURAL AS NEEDED
Status: DISCONTINUED | OUTPATIENT
Start: 2019-09-02 | End: 2019-09-02 | Stop reason: HOSPADM

## 2019-09-02 RX ORDER — POTASSIUM CHLORIDE 750 MG/1
40 CAPSULE, EXTENDED RELEASE ORAL AS NEEDED
Status: DISCONTINUED | OUTPATIENT
Start: 2019-09-02 | End: 2019-09-04 | Stop reason: HOSPADM

## 2019-09-02 RX ORDER — CARVEDILOL 3.12 MG/1
3.12 TABLET ORAL EVERY 12 HOURS SCHEDULED
Status: DISCONTINUED | OUTPATIENT
Start: 2019-09-02 | End: 2019-09-04 | Stop reason: HOSPADM

## 2019-09-02 RX ORDER — SODIUM CHLORIDE 9 MG/ML
125 INJECTION, SOLUTION INTRAVENOUS CONTINUOUS
Status: CANCELLED | OUTPATIENT
Start: 2019-09-02

## 2019-09-02 RX ORDER — SODIUM CHLORIDE 0.9 % (FLUSH) 0.9 %
10 SYRINGE (ML) INJECTION AS NEEDED
Status: CANCELLED | OUTPATIENT
Start: 2019-09-02

## 2019-09-02 RX ADMIN — HYDROCODONE BITARTRATE AND ACETAMINOPHEN 1 TABLET: 7.5; 325 TABLET ORAL at 20:06

## 2019-09-02 RX ADMIN — ASPIRIN 81 MG: 81 TABLET, COATED ORAL at 12:30

## 2019-09-02 RX ADMIN — LOSARTAN POTASSIUM 25 MG: 25 TABLET, FILM COATED ORAL at 12:30

## 2019-09-02 RX ADMIN — ATORVASTATIN CALCIUM 20 MG: 20 TABLET, FILM COATED ORAL at 20:05

## 2019-09-02 RX ADMIN — CARVEDILOL 3.12 MG: 3.12 TABLET, FILM COATED ORAL at 12:30

## 2019-09-02 RX ADMIN — SODIUM CHLORIDE 100 ML/HR: 9 INJECTION, SOLUTION INTRAVENOUS at 15:31

## 2019-09-02 RX ADMIN — POTASSIUM CHLORIDE 40 MEQ: 750 CAPSULE, EXTENDED RELEASE ORAL at 10:14

## 2019-09-02 RX ADMIN — CARVEDILOL 3.12 MG: 3.12 TABLET, FILM COATED ORAL at 20:05

## 2019-09-02 RX ADMIN — SODIUM CHLORIDE 125 ML/HR: 9 INJECTION, SOLUTION INTRAVENOUS at 06:02

## 2019-09-02 RX ADMIN — Medication 12.5 MG: at 13:07

## 2019-09-02 RX ADMIN — POTASSIUM CHLORIDE 40 MEQ: 750 CAPSULE, EXTENDED RELEASE ORAL at 14:40

## 2019-09-02 NOTE — PROGRESS NOTES
[facility] Medicine Services  INPATIENT PROGRESS NOTE    Length of Stay: 1  Date of Admission: 9/1/2019  Primary Care Physician: Lianna Dahl APRN    Subjective   Chief Complaint: No new complaints.    HPI: Patient is seen for follow-up today.  She is a 59-year-old female with an history of hypertension and degenerative arthritis who was admitted for non-ST elevation myocardial infarction.  She is doing well, remains chest pain-free and is scheduled for cardiac catheterization today.  She voices no new complaints.    Review of Systems   Constitutional: Negative for activity change, appetite change, chills, diaphoresis, fatigue and fever.   HENT: Negative for trouble swallowing and voice change.    Eyes: Negative for photophobia and visual disturbance.   Respiratory: Negative for cough, choking, chest tightness, shortness of breath, wheezing and stridor.    Cardiovascular: Negative for chest pain, palpitations and leg swelling.   Gastrointestinal: Negative for abdominal distention, abdominal pain, blood in stool, constipation, diarrhea, nausea and vomiting.   Endocrine: Negative for cold intolerance, heat intolerance, polydipsia, polyphagia and polyuria.   Genitourinary: Negative for decreased urine volume, difficulty urinating, dysuria, enuresis, flank pain, frequency, hematuria and urgency.   Musculoskeletal: Negative for arthralgias, gait problem, myalgias, neck pain and neck stiffness.   Skin: Negative for pallor, rash and wound.   Neurological: Negative for dizziness, tremors, seizures, syncope, facial asymmetry, speech difficulty, weakness, light-headedness, numbness and headaches.   Hematological: Does not bruise/bleed easily.   Psychiatric/Behavioral: Negative for agitation, behavioral problems and confusion.       Objective    Temp:  [98 °F (36.7 °C)-98.3 °F (36.8 °C)] 98.3 °F (36.8 °C)  Heart Rate:  [49-88] 76  Resp:  [14-20] 18  BP: ()/(50-91)  151/74    Physical Exam   Constitutional: She is oriented to person, place, and time. She appears well-developed and well-nourished. She is cooperative. No distress.   HENT:   Head: Normocephalic and atraumatic.   Right Ear: External ear normal.   Left Ear: External ear normal.   Nose: Nose normal.   Mouth/Throat: Oropharynx is clear and moist.   Eyes: Conjunctivae and EOM are normal. Pupils are equal, round, and reactive to light.   Neck: Normal range of motion. Neck supple. No JVD present. No thyromegaly present.   Cardiovascular: Normal rate, regular rhythm, normal heart sounds and intact distal pulses. Exam reveals no gallop and no friction rub.   No murmur heard.  Pulmonary/Chest: Effort normal and breath sounds normal. No stridor. No respiratory distress. She has no wheezes. She has no rales. She exhibits no tenderness.   Abdominal: Soft. Bowel sounds are normal. She exhibits no distension and no mass. There is no tenderness. There is no rebound and no guarding. No hernia.   Musculoskeletal: Normal range of motion. She exhibits no edema, tenderness or deformity.   Neurological: She is alert and oriented to person, place, and time. She has normal reflexes. No cranial nerve deficit or sensory deficit. She exhibits normal muscle tone.   Skin: Skin is warm and dry. No rash noted. She is not diaphoretic. No erythema. No pallor.   Psychiatric: She has a normal mood and affect. Her behavior is normal. Judgment and thought content normal.   Nursing note and vitals reviewed.        Medication Review:    Current Facility-Administered Medications:   •  [MAR Hold] acetaminophen (TYLENOL) tablet 650 mg, 650 mg, Oral, Q4H PRN **OR** [MAR Hold] acetaminophen (TYLENOL) 160 MG/5ML solution 650 mg, 650 mg, Oral, Q4H PRN **OR** [MAR Hold] acetaminophen (TYLENOL) suppository 650 mg, 650 mg, Rectal, Q4H PRN, Roni Pulido MD  •  diphenhydrAMINE (BENADRYL) injection 12.5 mg, 12.5 mg, Intravenous, Q6H PRN, Ashok  Roni PARSON MD  •  fentaNYL citrate (PF) (SUBLIMAZE) injection, , , PRN, Laura Aleman MD, 25 mcg at 09/02/19 1100  •  heparin 1000 units in sodium chloride 0.9% IV infusion, , , PRN, Laura Aleman MD, 1,000 mL at 09/02/19 1056  •  heparin 5000 units in sodium chloride 0.9% IV infusion, , , PRN, Laura Aleman MD, 500 mL at 09/02/19 1056  •  HYDROcodone-acetaminophen (NORCO) 7.5-325 MG per tablet 1 tablet, 1 tablet, Oral, BID PRN, Roni Pulido MD  •  iopamidol (ISOVUE-370) 76 % injection, , , PRN, Laura Aleman MD, 140 mL at 09/02/19 1124  •  lidocaine (XYLOCAINE) 2% injection, , , PRN, Laura Aleman MD, 20 mL at 09/02/19 1101  •  midazolam (VERSED) injection, , , PRN, Laura Aleman MD, 1 mg at 09/02/19 1059  •  nitroglycerin (NITROSTAT) SL tablet 0.4 mg, 0.4 mg, Sublingual, Q5 Min PRN, Bennie Starkey MD, 0.4 mg at 09/01/19 1525  •  nitroglycerin 50 mg/250 mL (0.2 mg/mL) infusion, 10-50 mcg/min, Intravenous, Titrated, Bennie Starkey MD, Stopped at 09/01/19 1642  •  pantoprazole (PROTONIX) EC tablet 40 mg, 40 mg, Oral, QAM, Roni Pulido MD  •  potassium chloride (KLOR-CON) packet 40 mEq, 40 mEq, Oral, PRN, Azalea Meneses APRN  •  potassium chloride (MICRO-K) CR capsule 40 mEq, 40 mEq, Oral, PRN, Azalea Meneses APRN, 40 mEq at 09/02/19 1014  •  [COMPLETED] Insert peripheral IV, , , Once **AND** sodium chloride 0.9 % flush 10 mL, 10 mL, Intravenous, PRN, Bennie Starkey MD  •  [MAR Hold] sodium chloride 0.9 % flush 10 mL, 10 mL, Intravenous, Q12H, Roni Pulido MD, 10 mL at 09/01/19 2113  •  [MAR Hold] sodium chloride 0.9 % flush 10 mL, 10 mL, Intravenous, PRN, Roni Pulido MD  •  sodium chloride 0.9 % flush 10 mL, 10 mL, Intravenous, PRN, Laura Aleman MD  •  sodium chloride 0.9 % flush 3 mL, 3 mL, Intravenous, Q12H, Laura Aleman MD  •  sodium chloride 0.9  % infusion, 100 mL/hr, Intravenous, Continuous, Jose Pierson MD, Last Rate: 100 mL/hr at 09/02/19 0911, 100 mL/hr at 09/02/19 0911  •  sodium chloride 0.9 % infusion, 125 mL/hr, Intravenous, Continuous, Laura Aleman MD    Results Review:  I have reviewed the labs, radiology results, and diagnostic studies.    Laboratory Data:   Results from last 7 days   Lab Units 09/02/19  0350 09/01/19  1450   SODIUM mmol/L 145 143   POTASSIUM mmol/L 3.3* 3.0*   CHLORIDE mmol/L 108* 102   CO2 mmol/L 25.0 27.0   BUN mg/dL 6 6   CREATININE mg/dL 0.61 0.66   GLUCOSE mg/dL 120* 105*   CALCIUM mg/dL 7.6* 9.0   ANION GAP mmol/L 12.0 14.0     Estimated Creatinine Clearance: 93.6 mL/min (by C-G formula based on SCr of 0.61 mg/dL).  Results from last 7 days   Lab Units 09/02/19  0350   MAGNESIUM mg/dL 1.7         Results from last 7 days   Lab Units 09/02/19  0350 09/01/19  1450   WBC 10*3/mm3 8.92 9.25   HEMOGLOBIN g/dL 11.3* 12.7   HEMATOCRIT % 33.5* 36.9   PLATELETS 10*3/mm3 226 293     Results from last 7 days   Lab Units 09/01/19  1450   INR  1.03       Culture Data:   No results found for: BLOODCX  No results found for: URINECX  No results found for: RESPCX  No results found for: WOUNDCX  No results found for: STOOLCX  No components found for: BODYFLD    Radiology Data:   Imaging Results (last 24 hours)     Procedure Component Value Units Date/Time    XR Chest 2 View [[id number]] Collected:  09/01/19 1408     Updated:  09/01/19 1450    Narrative:       PROCEDURE: XR CHEST 2 VW    VIEWS: PA/Lat    INDICATION: Chest pain    COMPARISON: CXR: 7/10/2016    FINDINGS:       - lines/tubes: none    - cardiac: size within normal limits.    - mediastinum: contour within normal limits.     - lungs: no evidence of a focal air space process, pulmonary  interstitial edema, nodule(s)/mass.     - pleura: no evidence of  fluid.      - osseous: unremarkable for age.      Impression:       No acute cardiopulmonary process  identified    Electronically signed by:  Lenora Trevizo MD  9/1/2019 2:49 PM CDT  Workstation: [id number]          I have reviewed the patient's current medications.     Assessment/Plan     Hospital Problem List:  Active Problems:    NSTEMI (non-ST elevated myocardial infarction): Patient remains clinically stable and chest pain-free.  Continue  guideline directed medical therapy.  She is scheduled for coronary angiogram today.  Echocardiogram is pending.  Cardiologist is following.    Hypokalemia: Replete potassium monitor levels.    Hypertension: Continue home medications with adjustments as needed for optimal blood pressure control.    Continue GI and DVT prophylaxis.        Discharge Planning: In progress.    Jose Pierson MD   09/02/19   11:30 AM

## 2019-09-02 NOTE — CONSULTS
CARDIOLOGY CONSULTATION NOTE    Referring Provider: Bennie Starkey MD/ Hospitalist Service    Reason for Consultation: Evaluation of chest pain/  NSTEMI    Denise Mulligan  1960  59 y.o. female      HPI  Denise Mulligan is a 59-year-old  lady who presented to the emergency room with retrosternal chest pain of sudden onset which radiated to her back, jaw and neck associated with some degree of shortness of breath and diaphoresis.  The pain was moderate to severe in nature and there was tingling and numbness left arm.  She had had a similar episode about 3 days prior and did take sublingual nitroglycerin that she had, with relief of symptoms.  She has no previous document of coronary artery disease or valvular heart disease.   Following admission, her troponins have been elevated at 0.3 and 0.4 suggestive of a non-ST segment elevation myocardial infarction and nonspecific T waves on EKG with poor R wave progression leads V1 and V2.  No acute changes were noted.  Her risk factors for coronary artery disease include positive family history for CAD her father having had multiple cardiac procedures at a young age.  She has no history of diabetes mellitus or documented management for hypertension though her blood pressure has been elevated in the past at times.  She is a non-smoker.  There is no history of hyperlipidemia.  The patient was still having mild chest discomfort at the time of my examination.    SUBJECTIVE    Past Medical History:   Diagnosis Date   • Arthritis    • Fibromyalgia    • Hypertension          History reviewed. No pertinent surgical history.      History reviewed. No pertinent family history.      Social History     Socioeconomic History   • Marital status:      Spouse name: Not on file   • Number of children: Not on file   • Years of education: Not on file   • Highest education level: Not on file   Tobacco Use   • Smoking status: Never Smoker   • Smokeless tobacco: Never Used  "  Substance and Sexual Activity   • Alcohol use: No   • Drug use: No         Allergies   Allergen Reactions   • Biaxin [Clarithromycin]    • Lovenox [Enoxaparin] Itching   • Penicillins    • Prednisone Anxiety and Mental Status Change     Patient states that she becomes very \"mean and irritable\".          Current Facility-Administered Medications   Medication Dose Route Frequency Provider Last Rate Last Dose   • acetaminophen (TYLENOL) tablet 650 mg  650 mg Oral Q4H PRN Roni Pulido MD        Or   • acetaminophen (TYLENOL) 160 MG/5ML solution 650 mg  650 mg Oral Q4H PRN Roni Pulido MD        Or   • acetaminophen (TYLENOL) suppository 650 mg  650 mg Rectal Q4H PRN Roni Pulido MD       • diphenhydrAMINE (BENADRYL) injection 12.5 mg  12.5 mg Intravenous Q6H PRN Roni Pulido MD       • HYDROcodone-acetaminophen (NORCO) 7.5-325 MG per tablet 1 tablet  1 tablet Oral BID PRN Roni Pulido MD       • nitroglycerin (NITROSTAT) SL tablet 0.4 mg  0.4 mg Sublingual Q5 Min PRN Bennie Starkey MD   0.4 mg at 09/01/19 1525   • nitroglycerin 50 mg/250 mL (0.2 mg/mL) infusion  10-50 mcg/min Intravenous Titrated Bennie Starkey MD   Stopped at 09/01/19 1642   • pantoprazole (PROTONIX) EC tablet 40 mg  40 mg Oral QAM Roni Pulido MD       • sodium chloride 0.9 % flush 10 mL  10 mL Intravenous PRN Bennie Starkey MD       • sodium chloride 0.9 % flush 10 mL  10 mL Intravenous Q12H Roni Pulido MD   10 mL at 09/01/19 2113   • sodium chloride 0.9 % flush 10 mL  10 mL Intravenous PRN Roni Pulido MD       • sodium chloride 0.9 % infusion  100 mL/hr Intravenous Continuous Jose Pierson  mL/hr at 09/02/19 0911 100 mL/hr at 09/02/19 0911         OBJECTIVE    /65   Pulse 54   Temp 98 °F (36.7 °C) (Oral)   Resp 18   Ht 149.9 cm (59\")   Wt 72.6 kg (160 lb 1.6 oz)   SpO2 100%   BMI 32.34 kg/m²       Review of Systems :    Constitutional:  " Denies recent weight loss, weight gain, fever or chills, no change in exercise tolerance.     HENT:  Denies any hearing loss, epistaxis, hoarseness, or difficulty speaking.     Eyes: Wears eyeglasses or contact lenses .    Respiratory:  Dyspnea with exertion,no cough, wheezing, or hemoptysis.     Cardiovascular: See HPI    Gastrointestinal:  Denies change in bowel habits, dyspepsia, ulcer disease, hematochezia, or melena.     Endocrine: Negative for cold intolerance, heat intolerance, polydipsia, polyphagia and polyuria. Denies any history of weight change, heat/cold intolerance, polydipsia, polyuria.     Genitourinary: Negative.      Musculoskeletal: Denies any history of arthritic symptoms or back problems.     Skin:  Denies any change in hair or nails, rashes, or skin lesions.     Allergic/Immunologic: Negative.  Negative for environmental allergies, food allergies and immunocompromised state.     Neurological:  Denies any history of recurrent headaches, strokes, TIA, or seizure disorder.     Hematological: Denies any food allergies, seasonal allergies, bleeding disorders, or lymphadenopathy.     Psychiatric/Behavioral: Denies any history of depression, substance abuse, or change in cognitive function.       Physical Exam:     Constitutional: Cooperative, alert and oriented,  in no acute distress.     HENT:   Head: Normocephalic, normal hair patterns, no masses or tenderness.  Ears, Nose, and Throat: No gross abnormalities. No pallor or cyanosis.   Eyes: EOMS intact, PERRL, conjunctivae and lids unremarkable. Fundoscopic exam and visual fields not performed.   Neck: No palpable masses or adenopathy, no thyromegaly, no JVD, carotid pulses are full and equal bilaterally and without bruit.     Cardiovascular: Regular rhythm, S1 and S2 normal, no S3 or S4.  No murmurs, gallops, or rubs detected.     Pulmonary/Chest: Chest: normal symmetry,  normal respiratory excursion, no intercostal retraction, no use of accessory  muscles. Pulmonary: Normal breath sounds - no rales or rhonchi.    Abdominal: Abdomen soft, bowel sounds normoactive, no masses, no hepatosplenomegaly, non-tender, no bruits.     Musculoskeletal: No deformities, clubbing, cyanosis, erythema, or edema observed. There are no spinal abnormalities noted. Normal muscle strength and tone. Pulses full and equal in all extremities, no bruits auscultated.     Neurological: No gross motor or sensory deficits noted, Cranial nerves 2-12 normal. affect appropriate, oriented to time, person, place.     Skin: Warm and dry to the touch, no apparent skin lesions or masses noted.     Psychiatric: Normal mood and affect. Behavior is normal. Judgment and thought content normal.     RESULTS  Lab Results (last 24 hours)     Procedure Component Value Units Date/Time    Hemoglobin A1c [[id number]]  (Normal) Collected:  09/02/19 0350    Specimen:  Blood Updated:  09/02/19 0527     Hemoglobin A1C 5.10 %     Narrative:       Hemoglobin A1C Ranges:    Increased Risk for Diabetes  5.7% to 6.4%  Diabetes                     >= 6.5%  Diabetic Goal                < 7.0%    TSH [[id number]]  (Abnormal) Collected:  09/02/19 0350    Specimen:  Blood Updated:  09/02/19 0436     TSH 4.260 uIU/mL     Lipid Panel [[id number]] Collected:  09/02/19 0350    Specimen:  Blood Updated:  09/02/19 0431     Total Cholesterol 129 mg/dL      Triglycerides 95 mg/dL      HDL Cholesterol 48 mg/dL      LDL Cholesterol  62 mg/dL      VLDL Cholesterol 19 mg/dL      LDL/HDL Ratio 1.29    Narrative:       Cholesterol Reference Ranges  (U.S. Department of Health and Human Services ATP III Classifications)    Desirable          <200 mg/dL  Borderline High    200-239 mg/dL  High Risk          >240 mg/dL      Triglyceride Reference Ranges  (U.S. Department of Health and Human Services ATP III Classifications)    Normal           <150 mg/dL  Borderline High  150-199 mg/dL  High             200-499 mg/dL  Very High        >500  mg/dL    HDL Reference Ranges  (U.S. Department of Health and Human Services ATP III Classifcations)    Low     <40 mg/dl (major risk factor for CHD)  High    >60 mg/dl ('negative' risk factor for CHD)        LDL Reference Ranges  (U.S. Department of Health and Human Services ATP III Classifcations)    Optimal          <100 mg/dL  Near Optimal     100-129 mg/dL  Borderline High  130-159 mg/dL  High             160-189 mg/dL  Very High        >189 mg/dL    Magnesium [[id number]]  (Normal) Collected:  09/02/19 0350    Specimen:  Blood Updated:  09/02/19 0431     Magnesium 1.7 mg/dL     Basic Metabolic Panel [[id number]]  (Abnormal) Collected:  09/02/19 0350    Specimen:  Blood Updated:  09/02/19 0431     Glucose 120 mg/dL      BUN 6 mg/dL      Creatinine 0.61 mg/dL      Sodium 145 mmol/L      Potassium 3.3 mmol/L      Chloride 108 mmol/L      CO2 25.0 mmol/L      Calcium 7.6 mg/dL      eGFR Non African Amer 101 mL/min/1.73      BUN/Creatinine Ratio 9.8     Anion Gap 12.0 mmol/L     Narrative:       GFR Normal >60  Chronic Kidney Disease <60  Kidney Failure <15    CBC Auto Differential [[id number]]  (Abnormal) Collected:  09/02/19 0350    Specimen:  Blood Updated:  09/02/19 0356     WBC 8.92 10*3/mm3      RBC 4.10 10*6/mm3      Hemoglobin 11.3 g/dL      Hematocrit 33.5 %      MCV 81.7 fL      MCH 27.6 pg      MCHC 33.7 g/dL      RDW 14.4 %      RDW-SD 42.0 fl      MPV 10.1 fL      Platelets 226 10*3/mm3      Neutrophil % 56.3 %      Lymphocyte % 32.4 %      Monocyte % 6.2 %      Eosinophil % 4.6 %      Basophil % 0.3 %      Immature Grans % 0.2 %      Neutrophils, Absolute 5.02 10*3/mm3      Lymphocytes, Absolute 2.89 10*3/mm3      Monocytes, Absolute 0.55 10*3/mm3      Eosinophils, Absolute 0.41 10*3/mm3      Basophils, Absolute 0.03 10*3/mm3      Immature Grans, Absolute 0.02 10*3/mm3      nRBC 0.0 /100 WBC     Troponin [[id number]]  (Abnormal) Collected:  09/01/19 2046    Specimen:  Blood Updated:  09/01/19 2119      Troponin T 0.386 ng/mL     Narrative:       Troponin T Reference Range:  <= 0.03 ng/mL-   Negative for AMI  >0.03 ng/mL-     Abnormal for myocardial necrosis.  Clinicians would have to utilize clinical acumen, EKG, Troponin and serial changes to determine if it is an Acute Myocardial Infarction or myocardial injury due to an underlying chronic condition.     Troponin [[id number]]  (Abnormal) Collected:  09/01/19 1806    Specimen:  Blood Updated:  09/01/19 1835     Troponin T 0.429 ng/mL     Narrative:       Troponin T Reference Range:  <= 0.03 ng/mL-   Negative for AMI  >0.03 ng/mL-     Abnormal for myocardial necrosis.  Clinicians would have to utilize clinical acumen, EKG, Troponin and serial changes to determine if it is an Acute Myocardial Infarction or myocardial injury due to an underlying chronic condition.     [facility] Draw [[id number]] Collected:  09/01/19 1450    Specimen:  Blood Updated:  09/01/19 1600    Narrative:       The following orders were created for panel order [facility] Draw.  Procedure                               Abnormality         Status                     ---------                               -----------         ------                     Light Blue Top[[id number]]                                   Final result               Green Top (Gel)[[id number]]                                  Final result               Lavender Top[[id number]]                                     Final result               Gold Top - SST[[id number]]                                   Final result                 Please view results for these tests on the individual orders.    Light Blue Top [[id number]] Collected:  09/01/19 1450    Specimen:  Blood Updated:  09/01/19 1600     Extra Tube hold for add-on     Comment: Auto resulted       Green Top (Gel) [[id number]] Collected:  09/01/19 1450    Specimen:  Blood Updated:  09/01/19 1600     Extra Tube Hold for add-ons.     Comment: Auto resulted.       Lavender Top [[id number]]  Collected:  09/01/19 1450    Specimen:  Blood Updated:  09/01/19 1600     Extra Tube hold for add-on     Comment: Auto resulted       Gold Top - SST [[id number]] Collected:  09/01/19 1450    Specimen:  Blood Updated:  09/01/19 1600     Extra Tube Hold for add-ons.     Comment: Auto resulted.       Troponin [[id number]]  (Abnormal) Collected:  09/01/19 1450    Specimen:  Blood Updated:  09/01/19 1523     Troponin T 0.212 ng/mL     Narrative:       Troponin T Reference Range:  <= 0.03 ng/mL-   Negative for AMI  >0.03 ng/mL-     Abnormal for myocardial necrosis.  Clinicians would have to utilize clinical acumen, EKG, Troponin and serial changes to determine if it is an Acute Myocardial Infarction or myocardial injury due to an underlying chronic condition.     Basic Metabolic Panel [[id number]]  (Abnormal) Collected:  09/01/19 1450    Specimen:  Blood Updated:  09/01/19 1522     Glucose 105 mg/dL      BUN 6 mg/dL      Creatinine 0.66 mg/dL      Sodium 143 mmol/L      Potassium 3.0 mmol/L      Chloride 102 mmol/L      CO2 27.0 mmol/L      Calcium 9.0 mg/dL      eGFR Non African Amer 92 mL/min/1.73      BUN/Creatinine Ratio 9.1     Anion Gap 14.0 mmol/L     Narrative:       GFR Normal >60  Chronic Kidney Disease <60  Kidney Failure <15    D-dimer, Quantitative [[id number]]  (Normal) Collected:  09/01/19 1450    Specimen:  Blood Updated:  09/01/19 1514     D-Dimer, Quantitative 321 ng/mL (FEU)     Narrative:       Dimer values <500 ng/ml FEU are FDA approved as aid in diagnosis of deep venous thrombosis and pulmonary embolism.  This test should not be used in an exclusion strategy with pretest probability alone.    A recent guideline regarding diagnosis for pulmonary thromboembolism recommends an adjusted exclusion criterion of age x 10 ng/ml FEU for patients >50 years of age (Deb Intern Med 2015; 163: 701-711).    Protime-INR [[id number]]  (Normal) Collected:  09/01/19 1450    Specimen:  Blood Updated:  09/01/19 1513      Protime 13.3 Seconds      INR 1.03    Narrative:       Therapeutic range for most indications is 2.0-3.0 INR,  or 2.5-3.5 for mechanical heart valves.    aPTT [[id number]]  (Normal) Collected:  09/01/19 1450    Specimen:  Blood Updated:  09/01/19 1513     PTT 31.4 seconds     Narrative:       The recommended Heparin therapeutic range is 68-97 seconds.    CBC & Differential [[id number]] Collected:  09/01/19 1450    Specimen:  Blood Updated:  09/01/19 1455    Narrative:       The following orders were created for panel order CBC & Differential.  Procedure                               Abnormality         Status                     ---------                               -----------         ------                     CBC Auto Differential[[id number]]        Normal              Final result                 Please view results for these tests on the individual orders.    CBC Auto Differential [[id number]]  (Normal) Collected:  09/01/19 1450    Specimen:  Blood Updated:  09/01/19 1455     WBC 9.25 10*3/mm3      RBC 4.67 10*6/mm3      Hemoglobin 12.7 g/dL      Hematocrit 36.9 %      MCV 79.0 fL      MCH 27.2 pg      MCHC 34.4 g/dL      RDW 13.8 %      RDW-SD 39.6 fl      MPV 10.1 fL      Platelets 293 10*3/mm3      Neutrophil % 62.1 %      Lymphocyte % 27.9 %      Monocyte % 6.3 %      Eosinophil % 3.1 %      Basophil % 0.3 %      Immature Grans % 0.3 %      Neutrophils, Absolute 5.74 10*3/mm3      Lymphocytes, Absolute 2.58 10*3/mm3      Monocytes, Absolute 0.58 10*3/mm3      Eosinophils, Absolute 0.29 10*3/mm3      Basophils, Absolute 0.03 10*3/mm3      Immature Grans, Absolute 0.03 10*3/mm3      nRBC 0.0 /100 WBC         Imaging Results (last 24 hours)     Procedure Component Value Units Date/Time    XR Chest 2 View [[id number]] Collected:  09/01/19 1408     Updated:  09/01/19 1450    Narrative:       PROCEDURE: XR CHEST 2 VW    VIEWS: PA/Lat    INDICATION: Chest pain    COMPARISON: CXR: 7/10/2016    FINDINGS:       -  lines/tubes: none    - cardiac: size within normal limits.    - mediastinum: contour within normal limits.     - lungs: no evidence of a focal air space process, pulmonary  interstitial edema, nodule(s)/mass.     - pleura: no evidence of  fluid.      - osseous: unremarkable for age.      Impression:       No acute cardiopulmonary process identified    Electronically signed by:  Lenora Trevizo MD  9/1/2019 2:49 PM CDT  Workstation: [id number]            ASSESSMENT AND PLAN    Denise Mulligan is a 59-year-old  lady with risk factors for coronary artery disease being positive family history for CAD, who has presented with chest pain suspicious for unstable angina and troponins have been elevated suggesting a non-ST segment elevation Myocardial infarction.  I believe that definitive evaluation by cardiac catheterization would be appropriate.  All risks and benefits have been explained to her in detail and she will be ASA II and Mallampati II.  Further recommendations will follow.  She has been scheduled for the procedure later today.  Her potassium was noted to be 3.3 this morning and this will be corrected with K supplements.    Laura Aleman MD  9/2/2019  9:39 AM

## 2019-09-03 LAB
ANION GAP SERPL CALCULATED.3IONS-SCNC: 8 MMOL/L (ref 5–15)
BASOPHILS # BLD AUTO: 0.04 10*3/MM3 (ref 0–0.2)
BASOPHILS NFR BLD AUTO: 0.4 % (ref 0–1.5)
BUN BLD-MCNC: 7 MG/DL (ref 6–20)
BUN/CREAT SERPL: 11.9 (ref 7–25)
CALCIUM SPEC-SCNC: 8.2 MG/DL (ref 8.6–10.5)
CHLORIDE SERPL-SCNC: 106 MMOL/L (ref 98–107)
CO2 SERPL-SCNC: 25 MMOL/L (ref 22–29)
CREAT BLD-MCNC: 0.59 MG/DL (ref 0.57–1)
DEPRECATED RDW RBC AUTO: 43.8 FL (ref 37–54)
EOSINOPHIL # BLD AUTO: 0.6 10*3/MM3 (ref 0–0.4)
EOSINOPHIL NFR BLD AUTO: 6.6 % (ref 0.3–6.2)
ERYTHROCYTE [DISTWIDTH] IN BLOOD BY AUTOMATED COUNT: 14.5 % (ref 12.3–15.4)
GFR SERPL CREATININE-BSD FRML MDRD: 105 ML/MIN/1.73
GLUCOSE BLD-MCNC: 121 MG/DL (ref 65–99)
HCT VFR BLD AUTO: 33.3 % (ref 34–46.6)
HGB BLD-MCNC: 11.1 G/DL (ref 12–15.9)
IMM GRANULOCYTES # BLD AUTO: 0.02 10*3/MM3 (ref 0–0.05)
IMM GRANULOCYTES NFR BLD AUTO: 0.2 % (ref 0–0.5)
LYMPHOCYTES # BLD AUTO: 2.94 10*3/MM3 (ref 0.7–3.1)
LYMPHOCYTES NFR BLD AUTO: 32.2 % (ref 19.6–45.3)
MCH RBC QN AUTO: 27.5 PG (ref 26.6–33)
MCHC RBC AUTO-ENTMCNC: 33.3 G/DL (ref 31.5–35.7)
MCV RBC AUTO: 82.6 FL (ref 79–97)
MONOCYTES # BLD AUTO: 0.7 10*3/MM3 (ref 0.1–0.9)
MONOCYTES NFR BLD AUTO: 7.7 % (ref 5–12)
NEUTROPHILS # BLD AUTO: 4.83 10*3/MM3 (ref 1.7–7)
NEUTROPHILS NFR BLD AUTO: 52.9 % (ref 42.7–76)
NRBC BLD AUTO-RTO: 0 /100 WBC (ref 0–0.2)
PLATELET # BLD AUTO: 238 10*3/MM3 (ref 140–450)
PMV BLD AUTO: 10.4 FL (ref 6–12)
POTASSIUM BLD-SCNC: 3.6 MMOL/L (ref 3.5–5.2)
POTASSIUM BLD-SCNC: 4.5 MMOL/L (ref 3.5–5.2)
RBC # BLD AUTO: 4.03 10*6/MM3 (ref 3.77–5.28)
SODIUM BLD-SCNC: 139 MMOL/L (ref 136–145)
WBC NRBC COR # BLD: 9.13 10*3/MM3 (ref 3.4–10.8)

## 2019-09-03 PROCEDURE — 97165 OT EVAL LOW COMPLEX 30 MIN: CPT

## 2019-09-03 PROCEDURE — 99232 SBSQ HOSP IP/OBS MODERATE 35: CPT | Performed by: INTERNAL MEDICINE

## 2019-09-03 PROCEDURE — 85025 COMPLETE CBC W/AUTO DIFF WBC: CPT | Performed by: INTERNAL MEDICINE

## 2019-09-03 PROCEDURE — 80048 BASIC METABOLIC PNL TOTAL CA: CPT | Performed by: INTERNAL MEDICINE

## 2019-09-03 PROCEDURE — 97161 PT EVAL LOW COMPLEX 20 MIN: CPT

## 2019-09-03 PROCEDURE — 84132 ASSAY OF SERUM POTASSIUM: CPT | Performed by: INTERNAL MEDICINE

## 2019-09-03 RX ADMIN — POTASSIUM CHLORIDE 40 MEQ: 750 CAPSULE, EXTENDED RELEASE ORAL at 07:31

## 2019-09-03 RX ADMIN — SODIUM CHLORIDE 100 ML/HR: 9 INJECTION, SOLUTION INTRAVENOUS at 01:33

## 2019-09-03 RX ADMIN — ASPIRIN 81 MG: 81 TABLET, COATED ORAL at 09:21

## 2019-09-03 RX ADMIN — ATORVASTATIN CALCIUM 20 MG: 20 TABLET, FILM COATED ORAL at 21:09

## 2019-09-03 RX ADMIN — CARVEDILOL 3.12 MG: 3.12 TABLET, FILM COATED ORAL at 21:09

## 2019-09-03 RX ADMIN — Medication 12.5 MG: at 09:21

## 2019-09-03 RX ADMIN — PANTOPRAZOLE SODIUM 40 MG: 40 TABLET, DELAYED RELEASE ORAL at 07:31

## 2019-09-03 RX ADMIN — PANTOPRAZOLE SODIUM 40 MG: 40 TABLET, DELAYED RELEASE ORAL at 05:38

## 2019-09-03 RX ADMIN — LOSARTAN POTASSIUM 25 MG: 25 TABLET, FILM COATED ORAL at 09:21

## 2019-09-03 RX ADMIN — POTASSIUM CHLORIDE 40 MEQ: 750 CAPSULE, EXTENDED RELEASE ORAL at 11:40

## 2019-09-03 RX ADMIN — SODIUM CHLORIDE, PRESERVATIVE FREE 10 ML: 5 INJECTION INTRAVENOUS at 21:09

## 2019-09-03 RX ADMIN — CARVEDILOL 3.12 MG: 3.12 TABLET, FILM COATED ORAL at 09:21

## 2019-09-03 NOTE — THERAPY DISCHARGE NOTE
Acute Care - Physical Therapy Initial Eval/Discharge  [facility]     Patient Name: Denise Mulligan  : 1960  MRN: [id number]  Today's Date: 9/3/2019   Onset of Illness/Injury or Date of Surgery: 19  Date of Referral to PT: 19  Referring Physician: Jose Pierson MD      Admit Date: 2019    Visit Dx:    ICD-10-CM ICD-9-CM   1. NSTEMI (non-ST elevated myocardial infarction) (CMS/[facility]) I21.4 410.70     Patient Active Problem List   Diagnosis   • NSTEMI (non-ST elevated myocardial infarction) (CMS/[facility])     Past Medical History:   Diagnosis Date   • Arthritis    • Fibromyalgia    • Hypertension      History reviewed. No pertinent surgical history.       PT ASSESSMENT (last 12 hours)      Physical Therapy Evaluation     Row Name 19 1007          PT Evaluation Time/Intention    Subjective Information  no complaints  -CZ     Document Type  evaluation  -CZ     Mode of Treatment  co-treatment;physical therapy;occupational therapy  -CZ     Patient Effort  good  -CZ     Symptoms Noted During/After Treatment  none  -CZ     Comment  Patient demonstrates (I) with functional mobility and low fall risk.   -CZ     Row Name 19 1007          General Information    Patient Profile Reviewed?  yes  -CZ     Onset of Illness/Injury or Date of Surgery  19  -CZ     Referring Physician  LYNSEY Pierson MD.   -CZ     Patient Observations  alert;cooperative;agree to therapy  -CZ     Patient/Family Observations  No family present.   -CZ     General Observations of Patient  seated in recliner, telemetry, RA, no apparent distress.   -CZ     Prior Level of Function  independent:;all household mobility;community mobility;ADL's;feeding;grooming;dressing;bathing;cooking;cleaning;driving working.   -CZ     Equipment Currently Used at Home  none  -CZ     Pertinent History of Current Functional Problem  Pt is a 58 yo F admitted for chest pain: NSTEMI.   -CZ     Existing Precautions/Restrictions  cardiac  -CZ      Equipment Issued to Patient  gait belt  -CZ     Benefits Reviewed  --  -CZ     Row Name 09/03/19 1007          Relationship/Environment    Lives With  spouse;parent(s);child(justin), dependent Spouse, mother, daughter (16 y/o).   -CZ     Primary Roles/Responsibilities  wage earner, full time [employer].   -CZ     Row Name 09/03/19 1007          Resource/Environmental Concerns    Current Living Arrangements  home/apartment/condo  -CZ     Row Name 09/03/19 1007          Living Environment    Living Arrangements  house  -CZ     Home Accessibility  stairs to enter home;tub/shower is not walk in  -CZ     Row Name 09/03/19 1007          Home Main Entrance    Number of Stairs, Main Entrance  three  -CZ     Stair Railings, Main Entrance  railing on left side (ascending)  -CZ     Row Name 09/03/19 1007          Cognitive Assessment/Intervention- PT/OT    Orientation Status (Cognition)  oriented x 4  -CZ     Follows Commands (Cognition)  WNL  -CZ     Row Name 09/03/19 1007          Transfer Assessment/Treatment    Transfer Assessment/Treatment  sit-stand transfer;stand-sit transfer  -CZ     Sit-Stand [place] (Transfers)  independent  -CZ     Stand-Sit [place] (Transfers)  independent  -CZ     Row Name 09/03/19 1007          Gait/Stairs Assessment/Training    [place] Level (Gait)  independent  -CZ     Distance in Feet (Gait)  150' x 1  -CZ     Pattern (Gait)  swing-through  -CZ     Comment (Gait/Stairs)  Good gait mechanics, no LOB.  -CZ     Row Name 09/03/19 1007          General ROM    GENERAL ROM COMMENTS  BLE ROM WFL grossly  -CZ     Row Name 09/03/19 1007          MMT (Manual Muscle Testing)    General MMT Comments  BLE strength 5/5 grossly  -CZ     Row Name 09/03/19 1007          Sensory Assessment/Intervention    Sensory General Assessment  no sensation deficits identified  -CZ     Row Name 09/03/19 1007          Pain Assessment    Additional Documentation  Pain Scale: Numbers Pre/Post-Treatment (Group)  -CZ      Row Name 09/03/19 1007          Pain Scale: Numbers Pre/Post-Treatment    Pain Scale: Numbers, Pretreatment  0/10 - no pain  -CZ     Pain Scale: Numbers, Post-Treatment  2/10  -CZ     Pain Location - Side  Right  -CZ     Pain Location  groin  -CZ     Pain Intervention(s)  Distraction;Repositioned  -CZ     Row Name 09/03/19 1007          Physical Therapy Clinical Impression    Date of Referral to PT  09/03/19  -CZ     Criteria for Skilled Interventions Met (PT Clinical Impression)  no;no problems identified which require skilled intervention  -CZ     Row Name 09/03/19 1007          Vital Signs    Pre Systolic BP Rehab  109  -CZ     Pre Treatment Diastolic BP  56  -CZ     Post Systolic BP Rehab  161  -CZ     Post Treatment Diastolic BP  73  -CZ     Pretreatment Heart Rate (beats/min)  63  -CZ     Posttreatment Heart Rate (beats/min)  58  -CZ     Pretreatment Resp Rate (breaths/min)  20  -CZ     Pre SpO2 (%)  97  -CZ     O2 Delivery Pre Treatment  room air  -CZ     Post SpO2 (%)  98  -CZ     O2 Delivery Post Treatment  room air  -CZ     Pre Patient Position  Sitting  -CZ     Post Patient Position  Sitting  -CZ     Row Name 09/03/19 1007          Positioning and Restraints    Pre-Treatment Position  sitting in chair/recliner  -CZ     Post Treatment Position  chair  -CZ     In Chair  sitting;call light within reach  -CZ       User Key  (r) = Recorded By, (t) = Taken By, (c) = Cosigned By    Initials Name Provider Type    CZ Tommy Henning, PT Physical Therapist          Physical Therapy Education     Title: PT OT SLP Therapies (Done)     Topic: Physical Therapy (Done)     Point: Precautions (Done)     Learning Progress Summary           Patient Acceptance, E, VU by ROSHNI at 9/3/2019 10:59 AM    Comment:  Patient's Tinneti score assessed as 28/28, which demonstrates low fall risk and RW is not indicated for mobility.                               User Key     Initials Effective Dates Name Provider Type Discipline    CZ  04/03/18 -  Tommy Henning, PT Physical Therapist PT                PT Recommendation and Plan  Anticipated Discharge Disposition (PT): home  Therapy Frequency (PT Clinical Impression): evaluation only  Outcome Summary/Treatment Plan (PT)  Anticipated Discharge Disposition (PT): home  Plan of Care Reviewed With: patient  Outcome Summary: Initial PT evaluation complete. Patient demonstrated (I) with sit <> stand trasnfers and ambulation. Patient demonstrated normal mechanics with all mobility. Patient is safe to discharge home.     Outcome Measures     Row Name 09/03/19 1008 09/03/19 1007          How much help from another person do you currently need...    Turning from your back to your side while in flat bed without using bedrails?  --  4  -CZ     Moving from lying on back to sitting on the side of a flat bed without bedrails?  --  4  -CZ     Moving to and from a bed to a chair (including a wheelchair)?  --  4  -CZ     Standing up from a chair using your arms (e.g., wheelchair, bedside chair)?  --  4  -CZ     Climbing 3-5 steps with a railing?  --  4  -CZ     To walk in hospital room?  --  4  -CZ     AM-PAC 6 Clicks Score (PT)  --  24  -CZ        How much help from another is currently needed...    Putting on and taking off regular lower body clothing?  4  -AS  --     Bathing (including washing, rinsing, and drying)  4  -AS  --     Toileting (which includes using toilet bed pan or urinal)  4  -AS  --     Putting on and taking off regular upper body clothing  4  -AS  --     Taking care of personal grooming (such as brushing teeth)  4  -AS  --     Eating meals  4  -AS  --     AM-PAC 6 Clicks Score (OT)  24  -AS  --        Tinetti Assessment    Tinetti Assessment  --  yes  -CZ     Sitting Balance  --  1  -CZ     Arises  --  2  -CZ     Attempts to Rise  --  2  -CZ     Immediate Standing Balance (first 5 sec)  --  2  -CZ     Standing Balance  --  2  -CZ     Sternal Nudge (feet close together)  --  2  -CZ     Eyes  "Closed (feet close together)  --  1  -CZ     Turning 360 Degrees- Steps  --  1  -CZ     Turning 360 Degrees- Steadiness  --  1  -CZ     Sitting Down  --  2  -CZ     Tinetti Balance Score  --  16  -CZ     Gait Initiation (immediate after told \"go\")  --  1  -CZ     Step Length- Right Swing  --  1  -CZ     Step Length- Left Swing  --  1  -CZ     Foot Clearance- Right Foot  --  1  -CZ     Foot Clearance- Left Foot  --  1  -CZ     Step Symmetry  --  1  -CZ     Step Continuity  --  1  -CZ     Path (excursion)  --  2  -CZ     Trunk  --  2  -CZ     Base of Support  --  1  -CZ     Gait Score  --  12  -CZ     Tinetti Total Score  --  28  -CZ     Tinetti Assistive Device  --  other (see comments) No AD  -CZ        Functional Assessment    Outcome Measure Options  AM-PAC 6 Clicks Daily Activity (OT)  -AS  AM-PAC 6 Clicks Basic Mobility (PT);Tinetti  -CZ       User Key  (r) = Recorded By, (t) = Taken By, (c) = Cosigned By    Initials Name Provider Type    CZ Tommy Henning, PT Physical Therapist    AS Alyssa Tran, OT Occupational Therapist           Time Calculation:   PT Charges     Row Name 09/03/19 1116             Time Calculation    Start Time  1001  -CZ      Stop Time  1036  -CZ      Time Calculation (min)  35 min  -CZ      PT Received On  09/03/19  -CZ      PT Goal Re-Cert Due Date  09/03/19  -CZ        User Key  (r) = Recorded By, (t) = Taken By, (c) = Cosigned By    Initials Name Provider Type    CZ Tommy Henning, PT Physical Therapist        Therapy Charges for Today     Code Description Service Date Service Provider Modifiers Qty    [id number]  PT EVAL LOW COMPLEXITY 2 9/3/2019 Tommy Henning, PT GP 1          PT G-Codes  Outcome Measure Options: AM-PAC 6 Clicks Daily Activity (OT)  AM-PAC 6 Clicks Score (PT): 24  AM-PAC 6 Clicks Score (OT): 24  Tinetti Total Score: 28    PT Discharge Summary  Anticipated Discharge Disposition (PT): home    Tommy Henning PT  9/3/2019       "

## 2019-09-03 NOTE — PLAN OF CARE
Problem: Patient Care Overview  Goal: Plan of Care Review  Outcome: Ongoing (interventions implemented as appropriate)   09/03/19 1001   Coping/Psychosocial   Plan of Care Reviewed With patient   Plan of Care Review   Progress improving   OTHER   Outcome Summary Initial PT evaluation complete. Patient demonstrated (I) with sit <> stand transfers and ambulates 150' x 1 without AD, good gait mechanics, no LOB. Patient scored 28/28 on Tinetti, denoting that the patient is a low fall risk. Patient demonstrated normal body mechanics and safety with all mobility. No further skilled PT intervention necessary.  Skilled PT discharged.

## 2019-09-03 NOTE — PROGRESS NOTES
CARDIOLOGY PROGRESS NOTE      LOS: 2 days   Patient Care Team:  Lianna Dahl APRN as PCP - General (Nurse Practitioner)    Problems/Diagnoses:   Denise Mulligan is a 59-year-old female with risk factors for coronary artery disease being positive family history, questionable hypertension, who presented with prolonged chest pain with nonspecific T wave changes on EKG and abnormal troponin of up to 0.4 consistent with non-ST segment elevation myocardial infarction. Definitive evaluation by cardiac catheterization was recommended.    Findings:  Left main coronary artery: This is a large patent vessel which divided into a left anterior descending coronary artery, ramus intermedius coronary artery and left circumflex coronary artery.  Left anterior descending coronary artery: This was a patent vessel which was medium size in the proximal segment and there were minor luminal irregularities in the proximal and mid segment with noncritical disease up to 20-30%.  Distal LAD was a small caliber vessel which was patent.  Diagonal 1 was a medium size patent vessel and diagonal 2 was a small caliber vessel.  Ramus intermedius coronary artery: This was a small patent vessel with no lesions  Left circumflex coronary artery: This is a large vessel which was mildly ectatic in the proximal segment with minor luminal irregularities.  The circumflex essentially continued as a tortuous obtuse marginal branch which was widely patent.  Right coronary artery: This was a large dominant patent vessel with noncritical disease up to 20 to 30% in the ostium/proximal segment and minor luminal irregularities were noted in the mid RCA.  Distally the PDA and PLV branches were patent.     Left heart catheterization and left ventriculogram: The left ventricle is mildly dilated with an ejection fraction of 40% with the basal segments aurea well.  The anterolateral and apical segment was hypokinetic/mildly dyskinetic consistent with Takotsubo  cardiomyopathy.     Impression: Coronary arteries were widely patent with minor plaques as described above in the LAD, LCx and RCA.  No flow-limiting lesions identified.  Left ventricle is mildly dilated with an EF of 40% with anterolateral and apical hypokinesis/mild dyskinesis consistent with Takotsubo cardiomyopathy.  Left ventricular end-diastolic pressure was elevated at 27 mmHg.    Echocardiogram showed:  · The left ventricular cavity is borderline dilated.  · Estimated EF = 43%.  · Mid/ Distal septum, Anterolateral wall/ apex hypokinetic/dyskinetic  · Left ventricular diastolic dysfunction (grade I) consistent with impaired relaxation.  · The findings are consistent with stress-induced (Takotsubo) cardiomyopathy.  · Left atrial cavity size is borderline dilated.     Subjective     Interval History: Symptomatically the patient feels much better.  No chest pain or dyspnea.     Review of Systems:     Constitutional:  Denies recent weight loss, weight gain, fever or chills, no change in exercise tolerance     HENT:  Denies any hearing loss, epistaxis, hoarseness, or difficulty speaking.     Eyes: Wears eyeglasses or contact lenses     Respiratory:  Denies dyspnea with exertion,no cough, wheezing, or hemoptysis.     Cardiovascular: Negative for palpitations, chest pain    Gastrointestinal:  Denies change in bowel habits, dyspepsia, ulcer disease, hematochezia, or melena.     Endocrine: Negative for cold intolerance, heat intolerance, polydipsia, polyphagia and polyuria.     Genitourinary: Negative.      Musculoskeletal: Denies any history of arthritic symptoms or back problems       Objective     Vital Signs  Temp:  [97.2 °F (36.2 °C)-98.5 °F (36.9 °C)] 97.2 °F (36.2 °C)  Heart Rate:  [56-80] 63  Resp:  [14-20] 16  BP: ()/(54-80) 140/80    Physical Exam:    Constitutional: Cooperative, alert and oriented, well-developed, well-nourished, in no acute distress.     HENT:   Head: Normocephalic, normal hair  patterns, no masses or tenderness.  Ears, Nose, and Throat: No gross abnormalities. No pallor or cyanosis. Dentition good.   Eyes: EOMS intact, PERRL, conjunctivae and lids unremarkable. Fundoscopic exam and visual fields not performed.   Neck: No palpable masses or adenopathy, no thyromegaly, no JVD, carotid pulses are full and equal bilaterally and without  Bruits.     Cardiovascular: Regular rhythm, S1 and S2 normal, no S3 or S4.  No murmurs, gallops, or rubs detected.     Pulmonary/Chest: Chest: normal symmetry, normal respiratory excursion, no intercostal retraction, no use of accessory muscles.            Pulmonary: Normal breath sounds. No rales or rhonchi.    Abdominal: Abdomen soft, bowel sounds normoactive, no masses, no hepatosplenomegaly, non-tender, no bruits.     Musculoskeletal: No deformities, clubbing, cyanosis, erythema, or edema observed.    Neurological: No gross motor or sensory deficits noted, affect appropriate, oriented to time, person, place.        Results Review:    Lab Results (last 24 hours)     Procedure Component Value Units Date/Time    Basic Metabolic Panel [[id number]]  (Abnormal) Collected:  09/03/19 0504    Specimen:  Blood Updated:  09/03/19 0558     Glucose 121 mg/dL      BUN 7 mg/dL      Creatinine 0.59 mg/dL      Sodium 139 mmol/L      Potassium 3.6 mmol/L      Chloride 106 mmol/L      CO2 25.0 mmol/L      Calcium 8.2 mg/dL      eGFR Non African Amer 105 mL/min/1.73      BUN/Creatinine Ratio 11.9     Anion Gap 8.0 mmol/L     Narrative:       GFR Normal >60  Chronic Kidney Disease <60  Kidney Failure <15    CBC & Differential [[id number]] Collected:  09/03/19 0504    Specimen:  Blood Updated:  09/03/19 0540    Narrative:       The following orders were created for panel order CBC & Differential.  Procedure                               Abnormality         Status                     ---------                               -----------         ------                     CBC Auto  Differential[[id number]]        Abnormal            Final result                 Please view results for these tests on the individual orders.    CBC Auto Differential [[id number]]  (Abnormal) Collected:  09/03/19 0504    Specimen:  Blood Updated:  09/03/19 0540     WBC 9.13 10*3/mm3      RBC 4.03 10*6/mm3      Hemoglobin 11.1 g/dL      Hematocrit 33.3 %      MCV 82.6 fL      MCH 27.5 pg      MCHC 33.3 g/dL      RDW 14.5 %      RDW-SD 43.8 fl      MPV 10.4 fL      Platelets 238 10*3/mm3      Neutrophil % 52.9 %      Lymphocyte % 32.2 %      Monocyte % 7.7 %      Eosinophil % 6.6 %      Basophil % 0.4 %      Immature Grans % 0.2 %      Neutrophils, Absolute 4.83 10*3/mm3      Lymphocytes, Absolute 2.94 10*3/mm3      Monocytes, Absolute 0.70 10*3/mm3      Eosinophils, Absolute 0.60 10*3/mm3      Basophils, Absolute 0.04 10*3/mm3      Immature Grans, Absolute 0.02 10*3/mm3      nRBC 0.0 /100 WBC     Potassium [[id number]]  (Normal) Collected:  09/02/19 1822    Specimen:  Blood Updated:  09/02/19 1841     Potassium 3.7 mmol/L           Medication Review:   Current Facility-Administered Medications   Medication Dose Route Frequency Provider Last Rate Last Dose   • acetaminophen (TYLENOL) tablet 650 mg  650 mg Oral Q4H PRN Roni Pulido MD        Or   • acetaminophen (TYLENOL) 160 MG/5ML solution 650 mg  650 mg Oral Q4H PRN Roni Pulido MD        Or   • acetaminophen (TYLENOL) suppository 650 mg  650 mg Rectal Q4H PRN Roni Pulido MD       • aspirin EC tablet 81 mg  81 mg Oral Daily Laura Aleman MD   81 mg at 09/03/19 0921   • atorvastatin (LIPITOR) tablet 20 mg  20 mg Oral Nightly Laura Aleman MD   20 mg at 09/02/19 2005   • carvedilol (COREG) tablet 3.125 mg  3.125 mg Oral Q12H Laura Aleman MD   3.125 mg at 09/03/19 0921   • diphenhydrAMINE (BENADRYL) injection 12.5 mg  12.5 mg Intravenous Q6H PRN Roni Pulido MD       •  HYDROcodone-acetaminophen (NORCO) 7.5-325 MG per tablet 1 tablet  1 tablet Oral BID PRN Roni Pulido MD   1 tablet at 09/02/19 2006   • losartan (COZAAR) tablet 25 mg  25 mg Oral Daily Laura Aleman MD   25 mg at 09/03/19 0921   • pantoprazole (PROTONIX) EC tablet 40 mg  40 mg Oral QAM Roni Pulido MD   40 mg at 09/03/19 0731   • potassium chloride (KLOR-CON) packet 40 mEq  40 mEq Oral PRN MenesesAzalea gil, APRN       • potassium chloride (MICRO-K) CR capsule 40 mEq  40 mEq Oral PRN MenesesAzalea gil, APRN   40 mEq at 09/03/19 1140   • sodium chloride 0.9 % flush 10 mL  10 mL Intravenous PRN Bennie Starkey MD       • sodium chloride 0.9 % flush 10 mL  10 mL Intravenous Q12H Roni Pulido MD   10 mL at 09/01/19 2113   • sodium chloride 0.9 % flush 10 mL  10 mL Intravenous PRN Roni Pulido MD       • spironolactone (ALDACTONE) half tablet 12.5 mg  12.5 mg Oral Daily Laura Aleman MD   12.5 mg at 09/03/19 0921         Assessment/Plan     Deniseeze Mulligan has evidence of stress-induced myocardial infarction/Takotsubo cardiomyopathy.  Symptomatically she is much better today.  Continue antiplatelet therapy with aspirin, lipid-lowering therapy with atorvastatin and management of LV dysfunction with Coreg, losartan, Aldactone.  If the patient is ambulating well without problems she could be discharged home from a cardiac standpoint tomorrow.    We will see her in office in 3 to 4 weeks.      NSTEMI (non-ST elevated myocardial infarction) (CMS/HCC)    Laura Aleman MD  09/03/19  2:30 PM

## 2019-09-03 NOTE — THERAPY DISCHARGE NOTE
Acute Care - Occupational Therapy Initial Eval/Discharge  [facility]     Patient Name: Denise Mulligan  : 1960  MRN: [id number]  Today's Date: 9/3/2019  Onset of Illness/Injury or Date of Surgery: 19  Date of Referral to OT: 19  Referring Physician: Jose Pierson MD      Admit Date: 2019       ICD-10-CM ICD-9-CM   1. NSTEMI (non-ST elevated myocardial infarction) (CMS/[facility]) I21.4 410.70     Patient Active Problem List   Diagnosis   • NSTEMI (non-ST elevated myocardial infarction) (CMS/[facility])     Past Medical History:   Diagnosis Date   • Arthritis    • Fibromyalgia    • Hypertension      History reviewed. No pertinent surgical history.       OT ASSESSMENT FLOWSHEET (last 12 hours)      Occupational Therapy Evaluation     Row Name 19 1008                   OT Evaluation Time/Intention    Subjective Information  no complaints  -AS        Document Type  evaluation  -AS        Mode of Treatment  co-treatment;physical therapy;occupational therapy  -AS        Total Evaluation Minutes, Occupational Therapy  29  -AS        Patient Effort  good  -AS        Symptoms Noted During/After Treatment  none  -AS           General Information    Patient Profile Reviewed?  yes  -AS        Onset of Illness/Injury or Date of Surgery  19  -AS        Referring Physician  Jose Pierson MD  -AS        Patient Observations  alert;cooperative;agree to therapy  -AS        Patient/Family Observations  no family present  -AS        General Observations of Patient  seated in recliner, telemetry, RA, no apparent distress.   -AS        Prior Level of Function  independent:;all household mobility;community mobility;ADL's;feeding;grooming;dressing;bathing;cooking;cleaning working  -AS        Equipment Currently Used at Home  none  -AS        Pertinent History of Current Functional Problem  Pt is a 58 yo F admitted for NSTEMI  -AS        Existing Precautions/Restrictions  cardiac  -AS        Equipment  Issued to Patient  gait belt  -AS        Risks Reviewed  patient:;LOB;nausea/vomiting;increased discomfort;dizziness;change in vital signs;increased drainage;lines disloged  -AS        Benefits Reviewed  patient:;improve function;increase independence;increase strength;decrease pain;increase balance;improve skin integrity;decrease risk of DVT;increase knowledge  -AS           Relationship/Environment    Lives With  spouse;child(justin), dependent spouse, mother  -AS        Primary Roles/Responsibilities  wage earner, full time [employer]  -AS           Resource/Environmental Concerns    Current Living Arrangements  home/apartment/condo;other (see comments) single level  -AS           Home Main Entrance    Number of Stairs, Main Entrance  three  -AS        Stair Railings, Main Entrance  railing on left side (ascending)  -AS           Cognitive Assessment/Intervention- PT/OT    Orientation Status (Cognition)  oriented x 4  -AS        Follows Commands (Cognition)  WNL  -AS        Safety Deficit (Cognitive)  impulsivity  -AS           Safety Issues, Functional Mobility    Safety Issues Affecting Function (Mobility)  impulsivity  -AS           Bed Mobility Assessment/Treatment    Comment (Bed Mobility)  NT tested; pt rec'd seated in chair  -AS           Functional Mobility    Functional Mobility- Ind. Level  independent  -AS        Functional Mobility- Device  other (see comments) no AD  -AS        Functional Mobility- Comment  chair to bathroom and around unit  -AS           Transfer Assessment/Treatment    Transfer Assessment/Treatment  stand-sit transfer;sit-stand transfer;toilet transfer  -AS           Sit-Stand Transfer    Sit-Stand Weld (Transfers)  independent  -AS           Stand-Sit Transfer    Stand-Sit Weld (Transfers)  independent Simultaneous filing. User may be unaware of other data.  -AS           Toilet Transfer    Type (Toilet Transfer)  sit-stand;stand-sit  -AS        Weld Level (Toilet  Transfer)  independent  -AS           ADL Assessment/Intervention    BADL Assessment/Intervention  upper body dressing;toileting;grooming  -AS           Upper Body Dressing Assessment/Training    Upper Body Dressing Fannin Level  don;front opening garment;set up;supervision  -AS        Upper Body Dressing Position  unsupported standing  -AS           Grooming Assessment/Training    Fannin Level (Grooming)  independent;other (see comments) standing at sink washing hands  -AS        Grooming Position  unsupported standing  -AS           Toileting Assessment/Training    Fannin Level (Toileting)  toileting skills;independent  -AS        Assistive Devices (Toileting)  commode  -AS        Toileting Position  unsupported sitting  -AS           General ROM    GENERAL ROM COMMENTS  BUE AROM WFL grossly  -AS           MMT (Manual Muscle Testing)    General MMT Comments  BUE 4/5 grossly  -AS           Sensory Assessment/Intervention    Sensory General Assessment  no sensation deficits identified BUE, light touch  -AS           Positioning and Restraints    Pre-Treatment Position  sitting in chair/recliner  -AS        Post Treatment Position  chair  -AS        In Chair  notified nsg;call light within reach;encouraged to call for assist;sitting  -AS           Pain Assessment    Additional Documentation  Pain Scale: Numbers Pre/Post-Treatment (Group)  -AS           Pain Scale: Numbers Pre/Post-Treatment    Pain Scale: Numbers, Pretreatment  0/10 - no pain  -AS        Pain Scale: Numbers, Post-Treatment  2/10  -AS        Pain Location - Side  Right  -AS        Pain Location  groin  -AS        Pain Intervention(s)  Repositioned;Ambulation/increased activity;Distraction  -AS           Plan of Care Review    Plan of Care Reviewed With  patient  -AS           Clinical Impression (OT)    Date of Referral to OT  09/03/19  -AS        OT Diagnosis  impaired ADL and functional mobility  -AS        Patient/Family Goals  Statement (OT Eval)  return to home  -AS        Criteria for Skilled Therapeutic Interventions Met (OT Eval)  no;no problems identified which require skilled intervention;current level of function same as previous level of function  -AS        Therapy Frequency (OT Eval)  evaluation only  -AS        Care Plan Review (OT)  evaluation/treatment results reviewed  -AS        Anticipated Discharge Disposition (OT)  home with assist assist PRN  -AS           Vital Signs    Pre Systolic BP Rehab  109  -AS        Pre Treatment Diastolic BP  56  -AS        Post Systolic BP Rehab  161  -AS        Post Treatment Diastolic BP  73  -AS        Pretreatment Heart Rate (beats/min)  63  -AS        Posttreatment Heart Rate (beats/min)  58  -AS        Pretreatment Resp Rate (breaths/min)  20  -AS        Pre SpO2 (%)  97  -AS        O2 Delivery Pre Treatment  room air  -AS        Post SpO2 (%)  98  -AS        O2 Delivery Post Treatment  room air  -AS        Pre Patient Position  Sitting  -AS        Post Patient Position  Sitting  -AS           Discharge Summary (Occupational Therapy)    Additional Documentation  Discharge Summary, OT Eval (Group)  -AS           Discharge Summary, OT Eval    Reason for Discharge (OT Discharge Summary)  no further needs identified  -AS           Living Environment    Home Accessibility  stairs to enter home;tub/shower is not walk in tub/shower combo  -AS          User Key  (r) = Recorded By, (t) = Taken By, (c) = Cosigned By    Initials Name Effective Dates    AS Alyssa Tran, OT 03/25/19 -               OT Recommendation and Plan  Outcome Summary/Treatment Plan (OT)  Anticipated Discharge Disposition (OT): home with assist(assist PRN)  Reason for Discharge (OT Discharge Summary): no further needs identified  Therapy Frequency (OT Eval): evaluation only  Plan of Care Review  Plan of Care Reviewed With: patient  Plan of Care Reviewed With: patient  Outcome Summary: OT eval completed as co-eval with PT  this date. Pt was pleasant and agreeable to participate in eval. Pt performed sit<>stand and functional mobility independently, walking from chair to bathroom and around unit with no AD. Pt was independent with toilet t/f and hand washing in standing at the sink and SPV to don hospital gown. Pt reports she is at her baseline function with ADLs and functional mobility and will have help as needed upon return to home. Skilled OT not required at this time. Recommend home with assist as needed upon d/c from facility.         Outcome Measures     Row Name 09/03/19 1008 09/03/19 1007          How much help from another person do you currently need...    Turning from your back to your side while in flat bed without using bedrails?  --  4  -CZ     Moving from lying on back to sitting on the side of a flat bed without bedrails?  --  4  -CZ     Moving to and from a bed to a chair (including a wheelchair)?  --  4  -CZ     Standing up from a chair using your arms (e.g., wheelchair, bedside chair)?  --  4  -CZ     Climbing 3-5 steps with a railing?  --  4  -CZ     To walk in hospital room?  --  4  -CZ     AM-PAC 6 Clicks Score (PT)  --  24  -CZ        How much help from another is currently needed...    Putting on and taking off regular lower body clothing?  4  -AS  --     Bathing (including washing, rinsing, and drying)  4  -AS  --     Toileting (which includes using toilet bed pan or urinal)  4  -AS  --     Putting on and taking off regular upper body clothing  4  -AS  --     Taking care of personal grooming (such as brushing teeth)  4  -AS  --     Eating meals  4  -AS  --     AM-PAC 6 Clicks Score (OT)  24  -AS  --        Tinetti Assessment    Tinetti Assessment  --  yes  -CZ     Sitting Balance  --  1  -CZ     Arises  --  2  -CZ     Attempts to Rise  --  2  -CZ     Immediate Standing Balance (first 5 sec)  --  2  -CZ     Standing Balance  --  2  -CZ     Sternal Nudge (feet close together)  --  2  -CZ     Eyes Closed (feet  "close together)  --  1  -CZ     Turning 360 Degrees- Steps  --  1  -CZ     Turning 360 Degrees- Steadiness  --  1  -CZ     Sitting Down  --  2  -CZ     Tinetti Balance Score  --  16  -CZ     Gait Initiation (immediate after told \"go\")  --  1  -CZ     Step Length- Right Swing  --  1  -CZ     Step Length- Left Swing  --  1  -CZ     Foot Clearance- Right Foot  --  1  -CZ     Foot Clearance- Left Foot  --  1  -CZ     Step Symmetry  --  1  -CZ     Step Continuity  --  1  -CZ     Path (excursion)  --  2  -CZ     Trunk  --  2  -CZ     Base of Support  --  1  -CZ     Gait Score  --  12  -CZ     Tinetti Total Score  --  28  -CZ     Tinetti Assistive Device  --  other (see comments) No AD  -CZ        Functional Assessment    Outcome Measure Options  AM-PAC 6 Clicks Daily Activity (OT)  -AS  AM-PAC 6 Clicks Basic Mobility (PT);Tinetti  -CZ       User Key  (r) = Recorded By, (t) = Taken By, (c) = Cosigned By    Initials Name Provider Type    CZ Tommy Henning, PT Physical Therapist    AS Alyssa Tran, OT Occupational Therapist          Time Calculation:   Time Calculation- OT     Row Name 09/03/19 1110             Time Calculation- OT    OT Start Time  1007  -AS      OT Stop Time  1036  -AS      OT Time Calculation (min)  29 min  -AS      OT Received On  09/03/19  -AS      OT Goal Re-Cert Due Date  --  -AS        User Key  (r) = Recorded By, (t) = Taken By, (c) = Cosigned By    Initials Name Provider Type    AS Alyssa Tran OT Occupational Therapist        Therapy Suggested Charges     Code   Minutes Charges    None           Therapy Charges for Today     Code Description Service Date Service Provider Modifiers Qty    [id number]  OT EVAL LOW COMPLEXITY 2 9/3/2019 Alyssa Tran OT GO 1               OT Discharge Summary  Anticipated Discharge Disposition (OT): home with assist(assist PRN)    Alyssa Tran OT  9/3/2019  "

## 2019-09-03 NOTE — PLAN OF CARE
Problem: Patient Care Overview  Goal: Plan of Care Review  Outcome: Ongoing (interventions implemented as appropriate)   09/03/19 1523   Coping/Psychosocial   Plan of Care Reviewed With patient   Plan of Care Review   Progress improving   OTHER   Outcome Summary transferred from CCU to [address] [place] [address], listed active problems under hospitalist Hypokalemia (no care plan available to document about), and NSTEMI. Heart Cath care plan added as client underwent a heart cath during stay, medically managing. R Groin access site has minimal bruising and NANCY. Soreness, but no complications currently.       Problem: Pain, Chronic (Adult)  Goal: Identify Related Risk Factors and Signs and Symptoms  Outcome: Outcome(s) achieved Date Met: 09/03/19    Goal: Acceptable Pain/Comfort Level and Functional Ability  Outcome: Ongoing (interventions implemented as appropriate)      Problem: Cardiac: ACS (Acute Coronary Syndrome) (Adult)  Goal: Signs and Symptoms of Listed Potential Problems Will be Absent, Minimized or Managed (Cardiac: ACS)  Outcome: Ongoing (interventions implemented as appropriate)      Problem: Cardiac Catheterization (Diagnostic/Interventional) (Adult)  Goal: Signs and Symptoms of Listed Potential Problems Will be Absent, Minimized or Managed (Cardiac Catheterization)  Outcome: Ongoing (interventions implemented as appropriate)

## 2019-09-03 NOTE — PROGRESS NOTES
[facility] Medicine Services  INPATIENT PROGRESS NOTE    Length of Stay: 2  Date of Admission: 9/1/2019  Primary Care Physician: Lianna Dahl APRN    Subjective   Chief Complaint: No new complaints.    HPI: Patient is seen for follow-up today.  She is a 59-year-old female with an history of hypertension and degenerative arthritis who was admitted for non-ST elevation myocardial infarction.  She is doing well and remains chest pain-free.  She had invasive coronary angiogram yesterday which showed widely patent coronary arteries with minor plaques in the LAD, left circumflex and RCA.   She voices no new complaints.    Review of Systems   Constitutional: Negative for activity change, appetite change, chills, diaphoresis, fatigue and fever.   HENT: Negative for trouble swallowing and voice change.    Eyes: Negative for photophobia and visual disturbance.   Respiratory: Negative for cough, choking, chest tightness, shortness of breath, wheezing and stridor.    Cardiovascular: Negative for chest pain, palpitations and leg swelling.   Gastrointestinal: Negative for abdominal distention, abdominal pain, blood in stool, constipation, diarrhea, nausea and vomiting.   Endocrine: Negative for cold intolerance, heat intolerance, polydipsia, polyphagia and polyuria.   Genitourinary: Negative for decreased urine volume, difficulty urinating, dysuria, enuresis, flank pain, frequency, hematuria and urgency.   Musculoskeletal: Negative for arthralgias, gait problem, myalgias, neck pain and neck stiffness.   Skin: Negative for pallor, rash and wound.   Neurological: Negative for dizziness, tremors, seizures, syncope, facial asymmetry, speech difficulty, weakness, light-headedness, numbness and headaches.   Hematological: Does not bruise/bleed easily.   Psychiatric/Behavioral: Negative for agitation, behavioral problems and confusion.       Objective    Temp:  [98 °F (36.7 °C)-98.5 °F (36.9  °C)] 98.1 °F (36.7 °C)  Heart Rate:  [57-84] 65  Resp:  [14-20] 16  BP: ()/(54-92) 124/61    Physical Exam   Constitutional: She is oriented to person, place, and time. She appears well-developed and well-nourished. She is cooperative. No distress.   HENT:   Head: Normocephalic and atraumatic.   Right Ear: External ear normal.   Left Ear: External ear normal.   Nose: Nose normal.   Mouth/Throat: Oropharynx is clear and moist.   Eyes: Conjunctivae and EOM are normal. Pupils are equal, round, and reactive to light.   Neck: Normal range of motion. Neck supple. No JVD present. No thyromegaly present.   Cardiovascular: Normal rate, regular rhythm, normal heart sounds and intact distal pulses. Exam reveals no gallop and no friction rub.   No murmur heard.  Pulmonary/Chest: Effort normal and breath sounds normal. No stridor. No respiratory distress. She has no wheezes. She has no rales. She exhibits no tenderness.   Abdominal: Soft. Bowel sounds are normal. She exhibits no distension and no mass. There is no tenderness. There is no rebound and no guarding. No hernia.   Musculoskeletal: Normal range of motion. She exhibits no edema, tenderness or deformity.   Neurological: She is alert and oriented to person, place, and time. She has normal reflexes. No cranial nerve deficit or sensory deficit. She exhibits normal muscle tone.   Skin: Skin is warm and dry. No rash noted. She is not diaphoretic. No erythema. No pallor.   Psychiatric: She has a normal mood and affect. Her behavior is normal. Judgment and thought content normal.   Nursing note and vitals reviewed.        Medication Review:    Current Facility-Administered Medications:   •  acetaminophen (TYLENOL) tablet 650 mg, 650 mg, Oral, Q4H PRN **OR** acetaminophen (TYLENOL) 160 MG/5ML solution 650 mg, 650 mg, Oral, Q4H PRN **OR** acetaminophen (TYLENOL) suppository 650 mg, 650 mg, Rectal, Q4H PRN, Roni Pulido MD  •  aspirin EC tablet 81 mg, 81 mg, Oral,  Daily, Laura Aleman MD, 81 mg at 09/03/19 0921  •  atorvastatin (LIPITOR) tablet 20 mg, 20 mg, Oral, Nightly, Laura Aleman MD, 20 mg at 09/02/19 2005  •  carvedilol (COREG) tablet 3.125 mg, 3.125 mg, Oral, Q12H, Laura Aleman MD, 3.125 mg at 09/03/19 0921  •  diphenhydrAMINE (BENADRYL) injection 12.5 mg, 12.5 mg, Intravenous, Q6H PRN, Roni Pulido MD  •  HYDROcodone-acetaminophen (NORCO) 7.5-325 MG per tablet 1 tablet, 1 tablet, Oral, BID PRN, Roni Pulido MD, 1 tablet at 09/02/19 2006  •  losartan (COZAAR) tablet 25 mg, 25 mg, Oral, Daily, Laura Aleman MD, 25 mg at 09/03/19 0921  •  pantoprazole (PROTONIX) EC tablet 40 mg, 40 mg, Oral, QAM, Roni Pulido MD, 40 mg at 09/03/19 0731  •  potassium chloride (KLOR-CON) packet 40 mEq, 40 mEq, Oral, PRN, Azalea Meneses, APRN  •  potassium chloride (MICRO-K) CR capsule 40 mEq, 40 mEq, Oral, PRN, Azalea Meneses, APRN, 40 mEq at 09/03/19 0731  •  [COMPLETED] Insert peripheral IV, , , Once **AND** sodium chloride 0.9 % flush 10 mL, 10 mL, Intravenous, PRN, Bennie Starkey MD  •  sodium chloride 0.9 % flush 10 mL, 10 mL, Intravenous, Q12H, Roni Pulido MD, 10 mL at 09/01/19 2113  •  sodium chloride 0.9 % flush 10 mL, 10 mL, Intravenous, PRN, Roni Pulido MD  •  spironolactone (ALDACTONE) half tablet 12.5 mg, 12.5 mg, Oral, Daily, Laura Aleman MD, 12.5 mg at 09/03/19 0921    Results Review:  I have reviewed the labs, radiology results, and diagnostic studies.    Laboratory Data:   Results from last 7 days   Lab Units 09/03/19  0504 09/02/19  1822 09/02/19  0350 09/01/19  1450   SODIUM mmol/L 139  --  145 143   POTASSIUM mmol/L 3.6 3.7 3.3* 3.0*   CHLORIDE mmol/L 106  --  108* 102   CO2 mmol/L 25.0  --  25.0 27.0   BUN mg/dL 7  --  6 6   CREATININE mg/dL 0.59  --  0.61 0.66   GLUCOSE mg/dL 121*  --  120* 105*   CALCIUM mg/dL 8.2*  --  7.6* 9.0    ANION GAP mmol/L 8.0  --  12.0 14.0     Estimated Creatinine Clearance: 96.8 mL/min (by C-G formula based on SCr of 0.59 mg/dL).  Results from last 7 days   Lab Units 09/02/19  0350   MAGNESIUM mg/dL 1.7         Results from last 7 days   Lab Units 09/03/19  0504 09/02/19  0350 09/01/19  1450   WBC 10*3/mm3 9.13 8.92 9.25   HEMOGLOBIN g/dL 11.1* 11.3* 12.7   HEMATOCRIT % 33.3* 33.5* 36.9   PLATELETS 10*3/mm3 238 226 293     Results from last 7 days   Lab Units 09/01/19  1450   INR  1.03       Culture Data:   No results found for: BLOODCX  No results found for: URINECX  No results found for: RESPCX  No results found for: WOUNDCX  No results found for: STOOLCX  No components found for: BODYFLD    Radiology Data:   Imaging Results (last 24 hours)     ** No results found for the last 24 hours. **          I have reviewed the patient's current medications.     Assessment/Plan     Hospital Problem List:  Active Problems:    NSTEMI (non-ST elevated myocardial infarction): Patient remains clinically stable and chest pain-free.   Invasive coronary angiogram done 9/2/2018 showed:  Coronary arteries were widely patent with minor plaques as described above in the LAD, LCx and RCA.  No flow-limiting lesions identified.  Left ventricle is mildly dilated with an EF of 40% with anterolateral and apical hypokinesis/mild dyskinesis consistent with Takotsubo cardiomyopathy.  Left ventricular end-diastolic pressure was elevated at 27 mmHg.  Echocardiogram done 9/2/2018 showed:  · The left ventricular cavity is borderline dilated.  · Estimated EF = 43%.  · Mid/ Distal septum, Anterolateral wall/ apex hypokinetic/dyskinetic  · Left ventricular diastolic dysfunction (grade I) consistent with impaired relaxation.  · The findings are consistent with stress-induced (Takotsubo) cardiomyopathy.  · Left atrial cavity size is borderline dilated.  Patient has been recommended medical management by the cardiologist.    Hypokalemia: Resolved.       Hypertension: Stable.  Continue current medications.     Subclinical hypothyroidism: Patient is to have repeat thyroid function test as an outpatient and to be followed by her primary care provider on discharge.    Continue GI and DVT prophylaxis.    Transfer out of CCU.    Discharge Planning: Likely discharge in a.humberto Pierson MD   09/03/19   9:57 AM

## 2019-09-03 NOTE — PLAN OF CARE
Problem: Patient Care Overview  Goal: Plan of Care Review  Outcome: Ongoing (interventions implemented as appropriate)   09/03/19 1008   Coping/Psychosocial   Plan of Care Reviewed With patient   OTHER   Outcome Summary OT eval completed as co-eval with PT this date. Pt was pleasant and agreeable to participate in eval. Pt performed sit<>stand and functional mobility independently, walking from chair to bathroom and around unit with no AD. Pt was independent with toilet t/f and hand washing in standing at the sink and SPV to don hospital gown. Pt reports she is at her baseline function with ADLs and functional mobility and will have help as needed upon return to home. Skilled OT not required at this time. Recommend home with assist as needed upon d/c from facility.

## 2019-09-04 VITALS
HEART RATE: 60 BPM | OXYGEN SATURATION: 95 % | RESPIRATION RATE: 18 BRPM | HEIGHT: 59 IN | BODY MASS INDEX: 32.05 KG/M2 | TEMPERATURE: 97.4 F | WEIGHT: 159 LBS | DIASTOLIC BLOOD PRESSURE: 62 MMHG | SYSTOLIC BLOOD PRESSURE: 124 MMHG

## 2019-09-04 LAB
ANION GAP SERPL CALCULATED.3IONS-SCNC: 11 MMOL/L (ref 5–15)
BASOPHILS # BLD AUTO: 0.03 10*3/MM3 (ref 0–0.2)
BASOPHILS NFR BLD AUTO: 0.3 % (ref 0–1.5)
BUN BLD-MCNC: 7 MG/DL (ref 6–20)
BUN/CREAT SERPL: 11.7 (ref 7–25)
CALCIUM SPEC-SCNC: 8.9 MG/DL (ref 8.6–10.5)
CHLORIDE SERPL-SCNC: 101 MMOL/L (ref 98–107)
CO2 SERPL-SCNC: 26 MMOL/L (ref 22–29)
CREAT BLD-MCNC: 0.6 MG/DL (ref 0.57–1)
DEPRECATED RDW RBC AUTO: 41.8 FL (ref 37–54)
EOSINOPHIL # BLD AUTO: 0.7 10*3/MM3 (ref 0–0.4)
EOSINOPHIL NFR BLD AUTO: 7.5 % (ref 0.3–6.2)
ERYTHROCYTE [DISTWIDTH] IN BLOOD BY AUTOMATED COUNT: 14.2 % (ref 12.3–15.4)
GFR SERPL CREATININE-BSD FRML MDRD: 103 ML/MIN/1.73
GLUCOSE BLD-MCNC: 120 MG/DL (ref 65–99)
HCT VFR BLD AUTO: 35.2 % (ref 34–46.6)
HGB BLD-MCNC: 11.9 G/DL (ref 12–15.9)
IMM GRANULOCYTES # BLD AUTO: 0.02 10*3/MM3 (ref 0–0.05)
IMM GRANULOCYTES NFR BLD AUTO: 0.2 % (ref 0–0.5)
LYMPHOCYTES # BLD AUTO: 2.67 10*3/MM3 (ref 0.7–3.1)
LYMPHOCYTES NFR BLD AUTO: 28.8 % (ref 19.6–45.3)
MCH RBC QN AUTO: 27.4 PG (ref 26.6–33)
MCHC RBC AUTO-ENTMCNC: 33.8 G/DL (ref 31.5–35.7)
MCV RBC AUTO: 81.1 FL (ref 79–97)
MONOCYTES # BLD AUTO: 0.65 10*3/MM3 (ref 0.1–0.9)
MONOCYTES NFR BLD AUTO: 7 % (ref 5–12)
NEUTROPHILS # BLD AUTO: 5.21 10*3/MM3 (ref 1.7–7)
NEUTROPHILS NFR BLD AUTO: 56.2 % (ref 42.7–76)
NRBC BLD AUTO-RTO: 0 /100 WBC (ref 0–0.2)
PLATELET # BLD AUTO: 259 10*3/MM3 (ref 140–450)
PMV BLD AUTO: 10.9 FL (ref 6–12)
POTASSIUM BLD-SCNC: 3.5 MMOL/L (ref 3.5–5.2)
RBC # BLD AUTO: 4.34 10*6/MM3 (ref 3.77–5.28)
SODIUM BLD-SCNC: 138 MMOL/L (ref 136–145)
WBC NRBC COR # BLD: 9.28 10*3/MM3 (ref 3.4–10.8)

## 2019-09-04 PROCEDURE — 80048 BASIC METABOLIC PNL TOTAL CA: CPT | Performed by: INTERNAL MEDICINE

## 2019-09-04 PROCEDURE — 85025 COMPLETE CBC W/AUTO DIFF WBC: CPT | Performed by: INTERNAL MEDICINE

## 2019-09-04 RX ORDER — ATORVASTATIN CALCIUM 20 MG/1
20 TABLET, FILM COATED ORAL NIGHTLY
Qty: 30 TABLET | Refills: 0 | Status: SHIPPED | OUTPATIENT
Start: 2019-09-04

## 2019-09-04 RX ORDER — SPIRONOLACTONE 25 MG/1
12.5 TABLET ORAL DAILY
Qty: 30 TABLET | Refills: 0 | Status: SHIPPED | OUTPATIENT
Start: 2019-09-05

## 2019-09-04 RX ORDER — ASPIRIN 81 MG/1
81 TABLET ORAL DAILY
Qty: 30 TABLET | Refills: 0 | Status: SHIPPED | OUTPATIENT
Start: 2019-09-05 | End: 2021-12-01

## 2019-09-04 RX ORDER — LOSARTAN POTASSIUM 25 MG/1
25 TABLET ORAL DAILY
Qty: 30 TABLET | Refills: 0 | Status: SHIPPED | OUTPATIENT
Start: 2019-09-05

## 2019-09-04 RX ORDER — CARVEDILOL 3.12 MG/1
3.12 TABLET ORAL EVERY 12 HOURS SCHEDULED
Qty: 60 TABLET | Refills: 0 | Status: SHIPPED | OUTPATIENT
Start: 2019-09-04

## 2019-09-04 RX ADMIN — ASPIRIN 81 MG: 81 TABLET, COATED ORAL at 08:50

## 2019-09-04 RX ADMIN — Medication 12.5 MG: at 08:50

## 2019-09-04 RX ADMIN — POTASSIUM CHLORIDE 40 MEQ: 750 CAPSULE, EXTENDED RELEASE ORAL at 08:50

## 2019-09-04 RX ADMIN — LOSARTAN POTASSIUM 25 MG: 25 TABLET, FILM COATED ORAL at 08:50

## 2019-09-04 RX ADMIN — PANTOPRAZOLE SODIUM 40 MG: 40 TABLET, DELAYED RELEASE ORAL at 06:35

## 2019-09-04 RX ADMIN — CARVEDILOL 3.12 MG: 3.12 TABLET, FILM COATED ORAL at 08:50

## 2019-09-04 NOTE — PLAN OF CARE
Problem: Patient Care Overview  Goal: Plan of Care Review  Outcome: Ongoing (interventions implemented as appropriate)   09/04/19 0438   Coping/Psychosocial   Plan of Care Reviewed With patient   Plan of Care Review   Progress improving   OTHER   Outcome Summary Pt's vss, no complaints of pain, appears to be resting, will continue to monitor     Goal: Individualization and Mutuality  Outcome: Ongoing (interventions implemented as appropriate)    Goal: Discharge Needs Assessment  Outcome: Ongoing (interventions implemented as appropriate)    Goal: Interprofessional Rounds/Family Conf  Outcome: Ongoing (interventions implemented as appropriate)      Problem: Pain, Chronic (Adult)  Goal: Acceptable Pain/Comfort Level and Functional Ability  Outcome: Ongoing (interventions implemented as appropriate)      Problem: Cardiac: ACS (Acute Coronary Syndrome) (Adult)  Goal: Signs and Symptoms of Listed Potential Problems Will be Absent, Minimized or Managed (Cardiac: ACS)  Outcome: Ongoing (interventions implemented as appropriate)      Problem: Cardiac Catheterization (Diagnostic/Interventional) (Adult)  Goal: Signs and Symptoms of Listed Potential Problems Will be Absent, Minimized or Managed (Cardiac Catheterization)  Outcome: Ongoing (interventions implemented as appropriate)    Goal: Anesthesia/Sedation Recovery  Outcome: Outcome(s) achieved Date Met: 09/04/19

## 2019-09-04 NOTE — DISCHARGE SUMMARY
[facility] Medicine Services  DISCHARGE SUMMARY       Date of Admission: 9/1/2019  Date of Discharge:  9/4/2019  Primary Care Physician: Lianna Dahl APRN    Presenting Problem/History of Present Illness:  Non-STEMI (non-ST elevated myocardial infarction) (CMS/[facility]) [I21.4]  NSTEMI (non-ST elevated myocardial infarction) (CMS/[facility]) [I21.4]       Final Discharge Diagnoses:  Active Hospital Problems    Diagnosis   • NSTEMI (non-ST elevated myocardial infarction) (CMS/[facility])       Consults:   Consults     Date and Time Order Name Status Description    9/2/2019 0832 Cardiology - Primary (on-call MD unless specified)            Procedures Performed: Procedure(s):  Left Heart Cath/ PCI if indicated                Pertinent Test Results:   Lab Results (most recent)     Procedure Component Value Units Date/Time    Basic Metabolic Panel [[id number]]  (Abnormal) Collected:  09/04/19 0623    Specimen:  Blood Updated:  09/04/19 0654     Glucose 120 mg/dL      BUN 7 mg/dL      Creatinine 0.60 mg/dL      Sodium 138 mmol/L      Potassium 3.5 mmol/L      Chloride 101 mmol/L      CO2 26.0 mmol/L      Calcium 8.9 mg/dL      eGFR Non African Amer 103 mL/min/1.73      BUN/Creatinine Ratio 11.7     Anion Gap 11.0 mmol/L     Narrative:       GFR Normal >60  Chronic Kidney Disease <60  Kidney Failure <15    CBC & Differential [[id number]] Collected:  09/04/19 0623    Specimen:  Blood Updated:  09/04/19 0633    Narrative:       The following orders were created for panel order CBC & Differential.  Procedure                               Abnormality         Status                     ---------                               -----------         ------                     CBC Auto Differential[[id number]]        Abnormal            Final result                 Please view results for these tests on the individual orders.    CBC Auto Differential [[id number]]  (Abnormal) Collected:  09/04/19 0623     Specimen:  Blood Updated:  09/04/19 0633     WBC 9.28 10*3/mm3      RBC 4.34 10*6/mm3      Hemoglobin 11.9 g/dL      Hematocrit 35.2 %      MCV 81.1 fL      MCH 27.4 pg      MCHC 33.8 g/dL      RDW 14.2 %      RDW-SD 41.8 fl      MPV 10.9 fL      Platelets 259 10*3/mm3      Neutrophil % 56.2 %      Lymphocyte % 28.8 %      Monocyte % 7.0 %      Eosinophil % 7.5 %      Basophil % 0.3 %      Immature Grans % 0.2 %      Neutrophils, Absolute 5.21 10*3/mm3      Lymphocytes, Absolute 2.67 10*3/mm3      Monocytes, Absolute 0.65 10*3/mm3      Eosinophils, Absolute 0.70 10*3/mm3      Basophils, Absolute 0.03 10*3/mm3      Immature Grans, Absolute 0.02 10*3/mm3      nRBC 0.0 /100 WBC     Potassium [[id number]]  (Normal) Collected:  09/03/19 1529    Specimen:  Blood Updated:  09/03/19 1547     Potassium 4.5 mmol/L     Basic Metabolic Panel [[id number]]  (Abnormal) Collected:  09/03/19 0504    Specimen:  Blood Updated:  09/03/19 0558     Glucose 121 mg/dL      BUN 7 mg/dL      Creatinine 0.59 mg/dL      Sodium 139 mmol/L      Potassium 3.6 mmol/L      Chloride 106 mmol/L      CO2 25.0 mmol/L      Calcium 8.2 mg/dL      eGFR Non African Amer 105 mL/min/1.73      BUN/Creatinine Ratio 11.9     Anion Gap 8.0 mmol/L     Narrative:       GFR Normal >60  Chronic Kidney Disease <60  Kidney Failure <15    CBC & Differential [[id number]] Collected:  09/03/19 0504    Specimen:  Blood Updated:  09/03/19 0540    Narrative:       The following orders were created for panel order CBC & Differential.  Procedure                               Abnormality         Status                     ---------                               -----------         ------                     CBC Auto Differential[[id number]]        Abnormal            Final result                 Please view results for these tests on the individual orders.    CBC Auto Differential [[id number]]  (Abnormal) Collected:  09/03/19 0504    Specimen:  Blood Updated:  09/03/19 0540     WBC  9.13 10*3/mm3      RBC 4.03 10*6/mm3      Hemoglobin 11.1 g/dL      Hematocrit 33.3 %      MCV 82.6 fL      MCH 27.5 pg      MCHC 33.3 g/dL      RDW 14.5 %      RDW-SD 43.8 fl      MPV 10.4 fL      Platelets 238 10*3/mm3      Neutrophil % 52.9 %      Lymphocyte % 32.2 %      Monocyte % 7.7 %      Eosinophil % 6.6 %      Basophil % 0.4 %      Immature Grans % 0.2 %      Neutrophils, Absolute 4.83 10*3/mm3      Lymphocytes, Absolute 2.94 10*3/mm3      Monocytes, Absolute 0.70 10*3/mm3      Eosinophils, Absolute 0.60 10*3/mm3      Basophils, Absolute 0.04 10*3/mm3      Immature Grans, Absolute 0.02 10*3/mm3      nRBC 0.0 /100 WBC     Potassium [[id number]]  (Normal) Collected:  09/02/19 1822    Specimen:  Blood Updated:  09/02/19 1841     Potassium 3.7 mmol/L     T4, Free [[id number]]  (Abnormal) Collected:  09/02/19 0350    Specimen:  Blood Updated:  09/02/19 1201     Free T4 0.85 ng/dL     Hemoglobin A1c [[id number]]  (Normal) Collected:  09/02/19 0350    Specimen:  Blood Updated:  09/02/19 0527     Hemoglobin A1C 5.10 %     Narrative:       Hemoglobin A1C Ranges:    Increased Risk for Diabetes  5.7% to 6.4%  Diabetes                     >= 6.5%  Diabetic Goal                < 7.0%    TSH [[id number]]  (Abnormal) Collected:  09/02/19 0350    Specimen:  Blood Updated:  09/02/19 0436     TSH 4.260 uIU/mL     Lipid Panel [[id number]] Collected:  09/02/19 0350    Specimen:  Blood Updated:  09/02/19 0431     Total Cholesterol 129 mg/dL      Triglycerides 95 mg/dL      HDL Cholesterol 48 mg/dL      LDL Cholesterol  62 mg/dL      VLDL Cholesterol 19 mg/dL      LDL/HDL Ratio 1.29    Narrative:       Cholesterol Reference Ranges  (U.S. Department of Health and Human Services ATP III Classifications)    Desirable          <200 mg/dL  Borderline High    200-239 mg/dL  High Risk          >240 mg/dL      Triglyceride Reference Ranges  (U.S. Department of Health and Human Services ATP III Classifications)    Normal           <150  mg/dL  Borderline High  150-199 mg/dL  High             200-499 mg/dL  Very High        >500 mg/dL    HDL Reference Ranges  (U.S. Department of Health and Human Services ATP III Classifcations)    Low     <40 mg/dl (major risk factor for CHD)  High    >60 mg/dl ('negative' risk factor for CHD)        LDL Reference Ranges  (U.S. Department of Health and Human Services ATP III Classifcations)    Optimal          <100 mg/dL  Near Optimal     100-129 mg/dL  Borderline High  130-159 mg/dL  High             160-189 mg/dL  Very High        >189 mg/dL    Magnesium [[id number]]  (Normal) Collected:  09/02/19 0350    Specimen:  Blood Updated:  09/02/19 0431     Magnesium 1.7 mg/dL     Troponin [[id number]]  (Abnormal) Collected:  09/01/19 2046    Specimen:  Blood Updated:  09/01/19 2119     Troponin T 0.386 ng/mL     Narrative:       Troponin T Reference Range:  <= 0.03 ng/mL-   Negative for AMI  >0.03 ng/mL-     Abnormal for myocardial necrosis.  Clinicians would have to utilize clinical acumen, EKG, Troponin and serial changes to determine if it is an Acute Myocardial Infarction or myocardial injury due to an underlying chronic condition.     Troponin [[id number]]  (Abnormal) Collected:  09/01/19 1806    Specimen:  Blood Updated:  09/01/19 1835     Troponin T 0.429 ng/mL     Narrative:       Troponin T Reference Range:  <= 0.03 ng/mL-   Negative for AMI  >0.03 ng/mL-     Abnormal for myocardial necrosis.  Clinicians would have to utilize clinical acumen, EKG, Troponin and serial changes to determine if it is an Acute Myocardial Infarction or myocardial injury due to an underlying chronic condition.     [place] Draw [[id number]] Collected:  09/01/19 1450    Specimen:  Blood Updated:  09/01/19 1600    Narrative:       The following orders were created for panel order [place] Draw.  Procedure                               Abnormality         Status                     ---------                               -----------          ------                     Light Blue Top[[id number]]                                   Final result               Green Top (Gel)[[id number]]                                  Final result               Lavender Top[[id number]]                                     Final result               Gold Top - SST[[id number]]                                   Final result                 Please view results for these tests on the individual orders.    Light Blue Top [[id number]] Collected:  09/01/19 1450    Specimen:  Blood Updated:  09/01/19 1600     Extra Tube hold for add-on     Comment: Auto resulted       Green Top (Gel) [[id number]] Collected:  09/01/19 1450    Specimen:  Blood Updated:  09/01/19 1600     Extra Tube Hold for add-ons.     Comment: Auto resulted.       Lavender Top [[id number]] Collected:  09/01/19 1450    Specimen:  Blood Updated:  09/01/19 1600     Extra Tube hold for add-on     Comment: Auto resulted       Gold Top - SST [[id number]] Collected:  09/01/19 1450    Specimen:  Blood Updated:  09/01/19 1600     Extra Tube Hold for add-ons.     Comment: Auto resulted.       D-dimer, Quantitative [[id number]]  (Normal) Collected:  09/01/19 1450    Specimen:  Blood Updated:  09/01/19 1514     D-Dimer, Quantitative 321 ng/mL (FEU)     Narrative:       Dimer values <500 ng/ml FEU are FDA approved as aid in diagnosis of deep venous thrombosis and pulmonary embolism.  This test should not be used in an exclusion strategy with pretest probability alone.    A recent guideline regarding diagnosis for pulmonary thromboembolism recommends an adjusted exclusion criterion of age x 10 ng/ml FEU for patients >50 years of age (Deb Intern Med 2015; 163: 701-711).    Protime-INR [[id number]]  (Normal) Collected:  09/01/19 1450    Specimen:  Blood Updated:  09/01/19 1513     Protime 13.3 Seconds      INR 1.03    Narrative:       Therapeutic range for most indications is 2.0-3.0 INR,  or 2.5-3.5 for mechanical heart valves.    aPTT  "[[id number]]  (Normal) Collected:  09/01/19 1450    Specimen:  Blood Updated:  09/01/19 1513     PTT 31.4 seconds     Narrative:       The recommended Heparin therapeutic range is 68-97 seconds.        Imaging Results (most recent)     Procedure Component Value Units Date/Time    XR Chest 2 View [[id number]] Collected:  09/01/19 1408     Updated:  09/01/19 1450    Narrative:       PROCEDURE: XR CHEST 2 VW    VIEWS: PA/Lat    INDICATION: Chest pain    COMPARISON: CXR: 7/10/2016    FINDINGS:       - lines/tubes: none    - cardiac: size within normal limits.    - mediastinum: contour within normal limits.     - lungs: no evidence of a focal air space process, pulmonary  interstitial edema, nodule(s)/mass.     - pleura: no evidence of  fluid.      - osseous: unremarkable for age.      Impression:       No acute cardiopulmonary process identified    Electronically signed by:  Lenora Trevizo MD  9/1/2019 2:49 PM CDT  Workstation: [id number]          Chief Complaint on Day of Discharge: None    Hospital Course:  The patient is a 59 y.o. female who presented to [facility] with chest pain.  Due to patient's risk factors and elevated troponins, cardiac catheterization was performed.  Patient had widely patent coronary arteries with a mildly dilated left ventricle and ejection fraction of 40%.  There was anterolateral and apical hypokinesis consistent with Takotsubo cardiomyopathy.  She improved significantly and was discharged home with aspirin, statin, Coreg, losartan, and Aldactone.  She will follow-up with her primary care provider in 1 week and with Dr. Aleman in 3 weeks.    Condition on Discharge: Stable    Physical Exam on Discharge:  /58 (BP Location: Right arm, Patient Position: Lying)   Pulse 60   Temp 97.9 °F (36.6 °C) (Temporal)   Resp 18   Ht 149.9 cm (59\")   Wt 72.1 kg (159 lb)   SpO2 94%   BMI 32.11 kg/m²   Physical Exam   Constitutional: She appears well-developed and well-nourished. "   HENT:   Head: Normocephalic and atraumatic.   Eyes: EOM are normal. Pupils are equal, round, and reactive to light.   Neck: Normal range of motion. Neck supple.   Cardiovascular: Normal rate, regular rhythm and normal heart sounds. Exam reveals no gallop and no friction rub.   No murmur heard.  Pulmonary/Chest: Effort normal and breath sounds normal. No respiratory distress. She has no wheezes. She has no rales. She exhibits no tenderness.   Abdominal: Soft. Bowel sounds are normal. She exhibits no distension. There is no tenderness. There is no guarding.   Musculoskeletal: She exhibits no edema.   Skin: Skin is warm and dry.   Psychiatric: She has a normal mood and affect. Her behavior is normal. Thought content normal.   Vitals reviewed.        Discharge Disposition:  Home or Self Care    Discharge Medications:     Discharge Medications      New Medications      Instructions Start Date   aspirin 81 MG EC tablet   81 mg, Oral, Daily   Start Date:  9/5/2019     atorvastatin 20 MG tablet  Commonly known as:  LIPITOR   20 mg, Oral, Nightly      carvedilol 3.125 MG tablet  Commonly known as:  COREG   3.125 mg, Oral, Every 12 Hours Scheduled      losartan 25 MG tablet  Commonly known as:  COZAAR   25 mg, Oral, Daily   Start Date:  9/5/2019     spironolactone 25 MG tablet  Commonly known as:  ALDACTONE   12.5 mg, Oral, Daily   Start Date:  9/5/2019        Continue These Medications      Instructions Start Date   HYDROcodone-acetaminophen 7.5-325 MG per tablet  Commonly known as:  NORCO   1 tablet, Oral, Every 6 Hours PRN      omeprazole 40 MG capsule  Commonly known as:  priLOSEC   40 mg, Oral, Daily         Stop These Medications    propranolol 10 MG tablet  Commonly known as:  INDERAL            Discharge Diet:   Diet Instructions     Diet: Cardiac      Discharge Diet:  Cardiac          Activity at Discharge:  [facility] restrictions per nursing        Follow-up Appointments:   PCP 1 week  Dr Hung in 3 weeks          This  document has been electronically signed by Kade Oliveira MD on September 4, 2019 10:34 AM      Time: 35 min

## 2019-09-04 NOTE — PLAN OF CARE
Problem: Patient Care Overview  Goal: Plan of Care Review  Outcome: Ongoing (interventions implemented as appropriate)   09/04/19 0923   Coping/Psychosocial   Plan of Care Reviewed With patient   Plan of Care Review   Progress no change   OTHER   Outcome Summary patient denies chest pain       Problem: Pain, Chronic (Adult)  Goal: Acceptable Pain/Comfort Level and Functional Ability  Outcome: Ongoing (interventions implemented as appropriate)      Problem: Cardiac: ACS (Acute Coronary Syndrome) (Adult)  Goal: Signs and Symptoms of Listed Potential Problems Will be Absent, Minimized or Managed (Cardiac: ACS)  Outcome: Ongoing (interventions implemented as appropriate)      Problem: Cardiac Catheterization (Diagnostic/Interventional) (Adult)  Goal: Signs and Symptoms of Listed Potential Problems Will be Absent, Minimized or Managed (Cardiac Catheterization)  Outcome: Ongoing (interventions implemented as appropriate)

## 2019-09-05 ENCOUNTER — READMISSION MANAGEMENT (OUTPATIENT)
Dept: CALL CENTER | Facility: HOSPITAL | Age: 59
End: 2019-09-05

## 2019-09-05 NOTE — OUTREACH NOTE
Prep Survey      Responses   Facility patient discharged from?  [place]   Is patient eligible?  Yes   Discharge diagnosis  NSTEMI, s/p LHC, Takotsubo cardiomyopathy   Does the patient have one of the following disease processes/diagnoses(primary or secondary)?  Acute MI (STEMI,NSTEMI)   Does the patient have Home health ordered?  No   Is there a DME ordered?  No   Comments regarding appointments  See AVS   Prep survey completed?  Yes          Cathi Brand RN

## 2019-09-06 ENCOUNTER — DOCUMENTATION (OUTPATIENT)
Dept: CARDIAC REHAB | Facility: HOSPITAL | Age: 59
End: 2019-09-06

## 2019-09-06 ENCOUNTER — READMISSION MANAGEMENT (OUTPATIENT)
Dept: CALL CENTER | Facility: HOSPITAL | Age: 59
End: 2019-09-06

## 2019-09-06 NOTE — OUTREACH NOTE
AMI Week 1 Survey      Responses   Facility patient discharged from?  [place]   Does the patient have one of the following disease processes/diagnoses(primary or secondary)?  Acute MI (STEMI,NSTEMI)   Is there a successful TCM telephone encounter documented?  No   Week 1 attempt successful?  Yes   Call start time  1653   Call end time  1700   Discharge diagnosis  NSTEMI, s/p LHC, Takotsubo cardiomyopathy   Is patient permission given to speak with other caregiver?  No   Meds reviewed with patient/caregiver?  Yes   Is the patient having any side effects they believe may be caused by any medication additions or changes?  No   Does the patient have all prescriptions related to this admission filled (includes statins,anticoagulants,HTN meds,anti-arrhythmia meds)  Yes   Is the patient taking all medications as directed (includes completed medication regime)?  Yes   Does the patient have a primary care provider?   Yes   Does the patient have an appointment with their PCP,cardiologist,or clinic within 7 days of discharge?  -- [Patient to go to urgent care on Monday, Tuesday, Wednesday next week for a hospital follow up. ]   Comments regarding PCP  DOMINIQUE Solorio    Has the patient kept scheduled appointments due by today?  N/A   Comments  Hospital Follow Up with Laura Aleman MD, Monday Oct 14, 2019 3:00 PM   Has home health visited the patient within 72 hours of discharge?  N/A   Psychosocial issues?  No   Did the patient receive a copy of their discharge instructions?  Yes   Nursing interventions  Reviewed instructions with patient   What is the patient's perception of their health status since discharge?  Improving   Nursing interventions  Nurse provided patient education   Is the patient/caregiver able to teach back signs and symptoms of when to call for help immediately:  Sudden chest discomfort, Sudden discomfort in arms, back, neck or jaw, Shortness of breath at any time, Sudden sweating or  clammy skin, Nausea or vomiting, Dizziness or lightheadedness, Irregular or rapid heart rate   Nursing interventions  Nurse provided patient education   Is the patient/caregiver able to teach back lifestyle changes to help prevent MIs  Regular exercise as approved by provider, Heart healthy diet, Maintaining a healthy weight, Reducing stress   Is the patient/caregiver able to teach back ways to prevent a second heart attack:  Follow up with MD, Manage risk factors   Is the patient/caregiver able to teach back the hierarchy of who to call/visit for symptoms/problems? PCP, Specialist, Home health nurse, Urgent Care, ED, 911  Yes   Week 1 call completed?  Yes          Cathi Ricks RN

## 2019-09-13 ENCOUNTER — READMISSION MANAGEMENT (OUTPATIENT)
Dept: CALL CENTER | Facility: HOSPITAL | Age: 59
End: 2019-09-13

## 2019-09-13 NOTE — OUTREACH NOTE
AMI Week 2 Survey      Responses   Facility patient discharged from?  [place]   Does the patient have one of the following disease processes/diagnoses(primary or secondary)?  Acute MI (STEMI,NSTEMI)   Week 2 attempt successful?  Yes   Call start time  1235   Call end time  1237   Discharge diagnosis  NSTEMI, s/p LHC, Takotsubo cardiomyopathy   Is patient permission given to speak with other caregiver?  Yes   Person spoke with today (if not patient) and relationship  Son Moises Drake reviewed with patient/caregiver?  Yes   Is the patient having any side effects they believe may be caused by any medication additions or changes?  No   Does the patient have all prescriptions related to this admission filled (includes statins,anticoagulants,HTN meds,anti-arrhythmia meds)  Yes   Is the patient taking all medications as directed (includes completed medication regime)?  Yes   Does the patient have a primary care provider?   Yes   Does the patient have an appointment with their PCP,cardiologist,or clinic within 7 days of discharge?  Yes   Has the patient kept scheduled appointments due by today?  Yes   Has home health visited the patient within 72 hours of discharge?  N/A   Psychosocial issues?  No   Did the patient receive a copy of their discharge instructions?  Yes   Nursing interventions  Reviewed instructions with patient   What is the patient's perception of their health status since discharge?  Improving   Nursing interventions  Nurse provided patient education   Is the patient/caregiver able to teach back signs and symptoms of when to call for help immediately:  Sudden chest discomfort, Sudden discomfort in arms, back, neck or jaw, Shortness of breath at any time, Sudden sweating or clammy skin, Nausea or vomiting, Dizziness or lightheadedness, Irregular or rapid heart rate   Nursing interventions  Nurse provided patient education   Is the pateint /caregiver able to teach back the importance of cardiac rehab?   Yes   Nursing interventions  Provided education on importance of cardiac rehab   Is the patient/caregiver able to teach back lifestyle changes to help prevent MIs  Regular exercise as approved by provider, Heart healthy diet, Maintaining a healthy weight, Reducing stress   Is the patient/caregiver able to teach back ways to prevent a second heart attack:  Follow up with MD, Manage risk factors   Is the patient/caregiver able to teach back the hierarchy of who to call/visit for symptoms/problems? PCP, Specialist, Home health nurse, Urgent Care, ED, 911  Yes   Additional teach back comments  Son says she is still weak, but has gone back to work at [employer].  She is doing fine as far as cath site at [facility].   Week 2 call completed?  Yes          Ct Howard RN

## 2019-09-23 ENCOUNTER — READMISSION MANAGEMENT (OUTPATIENT)
Dept: CALL CENTER | Facility: HOSPITAL | Age: 59
End: 2019-09-23

## 2019-09-23 NOTE — OUTREACH NOTE
AMI Week 3 Survey      Responses   Facility patient discharged from?  [place]   Does the patient have one of the following disease processes/diagnoses(primary or secondary)?  Acute MI (STEMI,NSTEMI)   Week 3 attempt successful?  Yes   Call start time  1328   Call end time  1330   Meds reviewed with patient/caregiver?  Yes   Is the patient taking all medications as directed (includes completed medication regime)?  Yes   Has the patient kept scheduled appointments due by today?  Yes   Comments  Has seen PCP doing well, no new issues just tired.    What is the patient's perception of their health status since discharge?  Improving   Week 3 call completed?  Yes   Revoked  No further contact(revokes)-requires comment   Graduated/Revoked comments  Has been back to work since 6 days after discharge, just tired , no new issues will call us if needed           Dianne Hess, RN

## 2019-10-21 DIAGNOSIS — Z00.00 GENERAL MEDICAL EXAM: Primary | ICD-10-CM

## 2019-12-16 ENCOUNTER — APPOINTMENT (OUTPATIENT)
Dept: CT IMAGING | Facility: HOSPITAL | Age: 59
End: 2019-12-16

## 2019-12-16 ENCOUNTER — HOSPITAL ENCOUNTER (OUTPATIENT)
Facility: HOSPITAL | Age: 59
Setting detail: OBSERVATION
Discharge: HOME | End: 2019-12-17
Attending: EMERGENCY MEDICINE | Admitting: INTERNAL MEDICINE

## 2019-12-16 DIAGNOSIS — R07.9 CHEST PAIN, UNSPECIFIED TYPE: Primary | ICD-10-CM

## 2019-12-16 DIAGNOSIS — E87.6 HYPOKALEMIA: ICD-10-CM

## 2019-12-16 LAB
ALBUMIN SERPL-MCNC: 4.2 G/DL (ref 3.5–5.2)
ALBUMIN/GLOB SERPL: 1.1 G/DL
ALP SERPL-CCNC: 113 U/L (ref 39–117)
ALT SERPL W P-5'-P-CCNC: 11 U/L (ref 1–33)
ANION GAP SERPL CALCULATED.3IONS-SCNC: 14 MMOL/L (ref 5–15)
AST SERPL-CCNC: 15 U/L (ref 1–32)
BASOPHILS # BLD AUTO: 0.03 10*3/MM3 (ref 0–0.2)
BASOPHILS NFR BLD AUTO: 0.3 % (ref 0–1.5)
BILIRUB SERPL-MCNC: 0.6 MG/DL (ref 0.2–1.2)
BILIRUB UR QL STRIP: NEGATIVE
BUN BLD-MCNC: 5 MG/DL (ref 6–20)
BUN/CREAT SERPL: 7.5 (ref 7–25)
CALCIUM SPEC-SCNC: 9.2 MG/DL (ref 8.6–10.5)
CHLORIDE SERPL-SCNC: 98 MMOL/L (ref 98–107)
CLARITY UR: CLEAR
CO2 SERPL-SCNC: 29 MMOL/L (ref 22–29)
COLOR UR: YELLOW
CREAT BLD-MCNC: 0.67 MG/DL (ref 0.57–1)
DEPRECATED RDW RBC AUTO: 40 FL (ref 37–54)
EOSINOPHIL # BLD AUTO: 0.28 10*3/MM3 (ref 0–0.4)
EOSINOPHIL NFR BLD AUTO: 2.8 % (ref 0.3–6.2)
ERYTHROCYTE [DISTWIDTH] IN BLOOD BY AUTOMATED COUNT: 13.7 % (ref 12.3–15.4)
GFR SERPL CREATININE-BSD FRML MDRD: 91 ML/MIN/1.73
GLOBULIN UR ELPH-MCNC: 3.9 GM/DL
GLUCOSE BLD-MCNC: 100 MG/DL (ref 65–99)
GLUCOSE UR STRIP-MCNC: NEGATIVE MG/DL
HCT VFR BLD AUTO: 39.2 % (ref 34–46.6)
HGB BLD-MCNC: 13.5 G/DL (ref 12–15.9)
HGB UR QL STRIP.AUTO: NEGATIVE
HOLD SPECIMEN: NORMAL
IMM GRANULOCYTES # BLD AUTO: 0.02 10*3/MM3 (ref 0–0.05)
IMM GRANULOCYTES NFR BLD AUTO: 0.2 % (ref 0–0.5)
KETONES UR QL STRIP: NEGATIVE
LEUKOCYTE ESTERASE UR QL STRIP.AUTO: NEGATIVE
LYMPHOCYTES # BLD AUTO: 2.33 10*3/MM3 (ref 0.7–3.1)
LYMPHOCYTES NFR BLD AUTO: 23.6 % (ref 19.6–45.3)
MCH RBC QN AUTO: 27.8 PG (ref 26.6–33)
MCHC RBC AUTO-ENTMCNC: 34.4 G/DL (ref 31.5–35.7)
MCV RBC AUTO: 80.8 FL (ref 79–97)
MONOCYTES # BLD AUTO: 0.5 10*3/MM3 (ref 0.1–0.9)
MONOCYTES NFR BLD AUTO: 5.1 % (ref 5–12)
NEUTROPHILS # BLD AUTO: 6.73 10*3/MM3 (ref 1.7–7)
NEUTROPHILS NFR BLD AUTO: 68 % (ref 42.7–76)
NITRITE UR QL STRIP: NEGATIVE
NRBC BLD AUTO-RTO: 0 /100 WBC (ref 0–0.2)
PH UR STRIP.AUTO: 6.5 [PH] (ref 5–9)
PLATELET # BLD AUTO: 327 10*3/MM3 (ref 140–450)
PMV BLD AUTO: 9.7 FL (ref 6–12)
POTASSIUM BLD-SCNC: 2.7 MMOL/L (ref 3.5–5.2)
PROT SERPL-MCNC: 8.1 G/DL (ref 6–8.5)
PROT UR QL STRIP: NEGATIVE
RBC # BLD AUTO: 4.85 10*6/MM3 (ref 3.77–5.28)
SODIUM BLD-SCNC: 141 MMOL/L (ref 136–145)
SP GR UR STRIP: 1.01 (ref 1–1.03)
TROPONIN T SERPL-MCNC: <0.01 NG/ML (ref 0–0.03)
UROBILINOGEN UR QL STRIP: NORMAL
WBC NRBC COR # BLD: 9.89 10*3/MM3 (ref 3.4–10.8)
WHOLE BLOOD HOLD SPECIMEN: NORMAL

## 2019-12-16 PROCEDURE — 80053 COMPREHEN METABOLIC PANEL: CPT | Performed by: EMERGENCY MEDICINE

## 2019-12-16 PROCEDURE — 93005 ELECTROCARDIOGRAM TRACING: CPT

## 2019-12-16 PROCEDURE — 96361 HYDRATE IV INFUSION ADD-ON: CPT

## 2019-12-16 PROCEDURE — 99285 EMERGENCY DEPT VISIT HI MDM: CPT

## 2019-12-16 PROCEDURE — 85025 COMPLETE CBC W/AUTO DIFF WBC: CPT | Performed by: EMERGENCY MEDICINE

## 2019-12-16 PROCEDURE — 84484 ASSAY OF TROPONIN QUANT: CPT | Performed by: EMERGENCY MEDICINE

## 2019-12-16 PROCEDURE — G0378 HOSPITAL OBSERVATION PER HR: HCPCS

## 2019-12-16 PROCEDURE — 96360 HYDRATION IV INFUSION INIT: CPT

## 2019-12-16 PROCEDURE — 93005 ELECTROCARDIOGRAM TRACING: CPT | Performed by: EMERGENCY MEDICINE

## 2019-12-16 PROCEDURE — 0 IOPAMIDOL PER 1 ML: Performed by: EMERGENCY MEDICINE

## 2019-12-16 PROCEDURE — 71275 CT ANGIOGRAPHY CHEST: CPT

## 2019-12-16 PROCEDURE — 81003 URINALYSIS AUTO W/O SCOPE: CPT | Performed by: EMERGENCY MEDICINE

## 2019-12-16 PROCEDURE — 93010 ELECTROCARDIOGRAM REPORT: CPT | Performed by: INTERNAL MEDICINE

## 2019-12-16 RX ORDER — ASPIRIN 325 MG
325 TABLET ORAL ONCE
Status: COMPLETED | OUTPATIENT
Start: 2019-12-16 | End: 2019-12-16

## 2019-12-16 RX ORDER — SODIUM CHLORIDE 0.9 % (FLUSH) 0.9 %
10 SYRINGE (ML) INJECTION AS NEEDED
Status: DISCONTINUED | OUTPATIENT
Start: 2019-12-16 | End: 2019-12-17 | Stop reason: HOSPADM

## 2019-12-16 RX ORDER — NITROGLYCERIN 0.4 MG/1
0.4 TABLET SUBLINGUAL
Status: DISCONTINUED | OUTPATIENT
Start: 2019-12-16 | End: 2019-12-17 | Stop reason: HOSPADM

## 2019-12-16 RX ORDER — POTASSIUM CHLORIDE 750 MG/1
40 CAPSULE, EXTENDED RELEASE ORAL ONCE
Status: COMPLETED | OUTPATIENT
Start: 2019-12-16 | End: 2019-12-16

## 2019-12-16 RX ADMIN — POTASSIUM CHLORIDE 40 MEQ: 750 CAPSULE, EXTENDED RELEASE ORAL at 22:12

## 2019-12-16 RX ADMIN — SODIUM CHLORIDE 1000 ML: 900 INJECTION, SOLUTION INTRAVENOUS at 20:10

## 2019-12-16 RX ADMIN — IOPAMIDOL 51 ML: 755 INJECTION, SOLUTION INTRAVENOUS at 21:26

## 2019-12-16 RX ADMIN — ASPIRIN 325 MG: 325 TABLET, COATED ORAL at 20:15

## 2019-12-16 RX ADMIN — NITROGLYCERIN 0.4 MG: 0.4 TABLET SUBLINGUAL at 20:00

## 2019-12-17 VITALS
HEIGHT: 59 IN | RESPIRATION RATE: 18 BRPM | TEMPERATURE: 98.7 F | WEIGHT: 157.4 LBS | BODY MASS INDEX: 31.73 KG/M2 | HEART RATE: 60 BPM | OXYGEN SATURATION: 97 % | SYSTOLIC BLOOD PRESSURE: 116 MMHG | DIASTOLIC BLOOD PRESSURE: 62 MMHG

## 2019-12-17 PROBLEM — I16.0 HYPERTENSIVE URGENCY: Chronic | Status: ACTIVE | Noted: 2019-12-17

## 2019-12-17 PROBLEM — I50.22 CHRONIC SYSTOLIC CHF (CONGESTIVE HEART FAILURE): Chronic | Status: ACTIVE | Noted: 2019-12-17

## 2019-12-17 LAB
ANION GAP SERPL CALCULATED.3IONS-SCNC: 10 MMOL/L (ref 5–15)
BUN BLD-MCNC: 5 MG/DL (ref 6–20)
BUN/CREAT SERPL: 9.1 (ref 7–25)
CALCIUM SPEC-SCNC: 8.7 MG/DL (ref 8.6–10.5)
CHLORIDE SERPL-SCNC: 103 MMOL/L (ref 98–107)
CO2 SERPL-SCNC: 28 MMOL/L (ref 22–29)
CREAT BLD-MCNC: 0.55 MG/DL (ref 0.57–1)
DEPRECATED RDW RBC AUTO: 40.7 FL (ref 37–54)
ERYTHROCYTE [DISTWIDTH] IN BLOOD BY AUTOMATED COUNT: 13.8 % (ref 12.3–15.4)
GFR SERPL CREATININE-BSD FRML MDRD: 114 ML/MIN/1.73
GLUCOSE BLD-MCNC: 100 MG/DL (ref 65–99)
HCT VFR BLD AUTO: 37.7 % (ref 34–46.6)
HGB BLD-MCNC: 13.2 G/DL (ref 12–15.9)
MCH RBC QN AUTO: 28.1 PG (ref 26.6–33)
MCHC RBC AUTO-ENTMCNC: 35 G/DL (ref 31.5–35.7)
MCV RBC AUTO: 80.2 FL (ref 79–97)
PLATELET # BLD AUTO: 318 10*3/MM3 (ref 140–450)
PMV BLD AUTO: 9.7 FL (ref 6–12)
POTASSIUM BLD-SCNC: 3.1 MMOL/L (ref 3.5–5.2)
POTASSIUM BLD-SCNC: 3.7 MMOL/L (ref 3.5–5.2)
RBC # BLD AUTO: 4.7 10*6/MM3 (ref 3.77–5.28)
SODIUM BLD-SCNC: 141 MMOL/L (ref 136–145)
TROPONIN T SERPL-MCNC: <0.01 NG/ML (ref 0–0.03)
WBC NRBC COR # BLD: 10.99 10*3/MM3 (ref 3.4–10.8)

## 2019-12-17 PROCEDURE — 84484 ASSAY OF TROPONIN QUANT: CPT | Performed by: INTERNAL MEDICINE

## 2019-12-17 PROCEDURE — G0378 HOSPITAL OBSERVATION PER HR: HCPCS

## 2019-12-17 PROCEDURE — 84132 ASSAY OF SERUM POTASSIUM: CPT | Performed by: INTERNAL MEDICINE

## 2019-12-17 PROCEDURE — 80048 BASIC METABOLIC PNL TOTAL CA: CPT | Performed by: INTERNAL MEDICINE

## 2019-12-17 PROCEDURE — 85027 COMPLETE CBC AUTOMATED: CPT | Performed by: INTERNAL MEDICINE

## 2019-12-17 RX ORDER — ACETAMINOPHEN 160 MG/5ML
650 SOLUTION ORAL EVERY 4 HOURS PRN
Status: DISCONTINUED | OUTPATIENT
Start: 2019-12-17 | End: 2019-12-17 | Stop reason: SDUPTHER

## 2019-12-17 RX ORDER — DOCUSATE SODIUM 100 MG/1
100 CAPSULE, LIQUID FILLED ORAL 2 TIMES DAILY
Status: DISCONTINUED | OUTPATIENT
Start: 2019-12-17 | End: 2019-12-17 | Stop reason: HOSPADM

## 2019-12-17 RX ORDER — ASPIRIN 81 MG/1
81 TABLET ORAL DAILY
Status: DISCONTINUED | OUTPATIENT
Start: 2019-12-17 | End: 2019-12-17 | Stop reason: HOSPADM

## 2019-12-17 RX ORDER — POTASSIUM CHLORIDE 1.5 G/1.77G
40 POWDER, FOR SOLUTION ORAL AS NEEDED
Status: DISCONTINUED | OUTPATIENT
Start: 2019-12-17 | End: 2019-12-17 | Stop reason: HOSPADM

## 2019-12-17 RX ORDER — PANTOPRAZOLE SODIUM 40 MG/1
40 TABLET, DELAYED RELEASE ORAL EVERY MORNING
Status: DISCONTINUED | OUTPATIENT
Start: 2019-12-17 | End: 2019-12-17 | Stop reason: HOSPADM

## 2019-12-17 RX ORDER — ACETAMINOPHEN 325 MG/1
650 TABLET ORAL EVERY 4 HOURS PRN
Start: 2019-12-17 | End: 2021-12-01

## 2019-12-17 RX ORDER — CARVEDILOL 3.12 MG/1
3.12 TABLET ORAL EVERY 12 HOURS SCHEDULED
Status: DISCONTINUED | OUTPATIENT
Start: 2019-12-17 | End: 2019-12-17 | Stop reason: HOSPADM

## 2019-12-17 RX ORDER — NALOXONE HCL 0.4 MG/ML
0.4 VIAL (ML) INJECTION
Status: DISCONTINUED | OUTPATIENT
Start: 2019-12-17 | End: 2019-12-17 | Stop reason: HOSPADM

## 2019-12-17 RX ORDER — ACETAMINOPHEN 650 MG/1
650 SUPPOSITORY RECTAL EVERY 4 HOURS PRN
Status: DISCONTINUED | OUTPATIENT
Start: 2019-12-17 | End: 2019-12-17 | Stop reason: HOSPADM

## 2019-12-17 RX ORDER — ONDANSETRON 4 MG/1
4 TABLET, FILM COATED ORAL EVERY 6 HOURS PRN
Status: DISCONTINUED | OUTPATIENT
Start: 2019-12-17 | End: 2019-12-17 | Stop reason: HOSPADM

## 2019-12-17 RX ORDER — ATORVASTATIN CALCIUM 20 MG/1
20 TABLET, FILM COATED ORAL NIGHTLY
Status: DISCONTINUED | OUTPATIENT
Start: 2019-12-17 | End: 2019-12-17 | Stop reason: HOSPADM

## 2019-12-17 RX ORDER — LOSARTAN POTASSIUM 25 MG/1
25 TABLET ORAL DAILY
Status: DISCONTINUED | OUTPATIENT
Start: 2019-12-17 | End: 2019-12-17 | Stop reason: HOSPADM

## 2019-12-17 RX ORDER — SODIUM CHLORIDE 0.9 % (FLUSH) 0.9 %
10 SYRINGE (ML) INJECTION EVERY 12 HOURS SCHEDULED
Status: DISCONTINUED | OUTPATIENT
Start: 2019-12-17 | End: 2019-12-17 | Stop reason: HOSPADM

## 2019-12-17 RX ORDER — ONDANSETRON 2 MG/ML
4 INJECTION INTRAMUSCULAR; INTRAVENOUS EVERY 6 HOURS PRN
Status: DISCONTINUED | OUTPATIENT
Start: 2019-12-17 | End: 2019-12-17 | Stop reason: HOSPADM

## 2019-12-17 RX ORDER — POTASSIUM CHLORIDE 7.45 MG/ML
10 INJECTION INTRAVENOUS
Status: DISCONTINUED | OUTPATIENT
Start: 2019-12-17 | End: 2019-12-17 | Stop reason: HOSPADM

## 2019-12-17 RX ORDER — ACETAMINOPHEN 325 MG/1
650 TABLET ORAL EVERY 4 HOURS PRN
Status: DISCONTINUED | OUTPATIENT
Start: 2019-12-17 | End: 2019-12-17 | Stop reason: HOSPADM

## 2019-12-17 RX ORDER — POTASSIUM CHLORIDE 750 MG/1
40 CAPSULE, EXTENDED RELEASE ORAL AS NEEDED
Status: DISCONTINUED | OUTPATIENT
Start: 2019-12-17 | End: 2019-12-17 | Stop reason: HOSPADM

## 2019-12-17 RX ORDER — SODIUM CHLORIDE 0.9 % (FLUSH) 0.9 %
10 SYRINGE (ML) INJECTION AS NEEDED
Status: DISCONTINUED | OUTPATIENT
Start: 2019-12-17 | End: 2019-12-17 | Stop reason: HOSPADM

## 2019-12-17 RX ORDER — SODIUM CHLORIDE 9 MG/ML
50 INJECTION, SOLUTION INTRAVENOUS CONTINUOUS
Status: DISCONTINUED | OUTPATIENT
Start: 2019-12-17 | End: 2019-12-17 | Stop reason: HOSPADM

## 2019-12-17 RX ORDER — MORPHINE SULFATE 2 MG/ML
1 INJECTION, SOLUTION INTRAMUSCULAR; INTRAVENOUS EVERY 4 HOURS PRN
Status: DISCONTINUED | OUTPATIENT
Start: 2019-12-17 | End: 2019-12-17 | Stop reason: HOSPADM

## 2019-12-17 RX ADMIN — POTASSIUM CHLORIDE 40 MEQ: 750 CAPSULE, EXTENDED RELEASE ORAL at 05:55

## 2019-12-17 RX ADMIN — POTASSIUM CHLORIDE 40 MEQ: 750 CAPSULE, EXTENDED RELEASE ORAL at 02:45

## 2019-12-17 RX ADMIN — SODIUM CHLORIDE 50 ML/HR: 900 INJECTION, SOLUTION INTRAVENOUS at 00:59

## 2019-12-17 RX ADMIN — ASPIRIN 81 MG: 81 TABLET, COATED ORAL at 09:27

## 2019-12-17 RX ADMIN — LOSARTAN POTASSIUM 25 MG: 25 TABLET, FILM COATED ORAL at 09:27

## 2019-12-17 RX ADMIN — Medication 12.5 MG: at 09:27

## 2019-12-17 RX ADMIN — ATORVASTATIN CALCIUM 20 MG: 20 TABLET, FILM COATED ORAL at 00:54

## 2019-12-17 RX ADMIN — SODIUM CHLORIDE, PRESERVATIVE FREE 10 ML: 5 INJECTION INTRAVENOUS at 09:27

## 2019-12-17 RX ADMIN — CARVEDILOL 3.12 MG: 3.12 TABLET, FILM COATED ORAL at 00:54

## 2019-12-17 RX ADMIN — PANTOPRAZOLE SODIUM 40 MG: 40 TABLET, DELAYED RELEASE ORAL at 05:55

## 2019-12-17 RX ADMIN — CARVEDILOL 3.12 MG: 3.12 TABLET, FILM COATED ORAL at 09:27

## 2019-12-17 NOTE — PLAN OF CARE
New admit from ER, VSS. Will continue to monitor.  Problem: Patient Care Overview  Goal: Plan of Care Review  Outcome: Ongoing (interventions implemented as appropriate)  Flowsheets (Taken 12/17/2019 0321)  Progress: no change  Plan of Care Reviewed With: patient

## 2019-12-17 NOTE — H&P
"      [facility] Medicine Admission      Date of Admission: 12/16/2019      Primary Care Physician: Lianna Dahl APRN      Chief Complaint: \"I have a chest pain\"    HPI:The patient is a 59-year-old  woman with history of coronary artery disease.  She came into the emergency department with a sudden onset of left-sided chest pain, which she said started spontaneously, was 6/10 on the pain scale, and seems to radiate to her jaw and her back.  She took 1 dose of sublingual nitroglycerin, and subsequently had an improvement in her chest pain.  After she got to the emergency department, she was administered another dose of sublingual nitroglycerin.  This completely resolved her symptoms.    On further questioning, the patient states that she recently underwent a cardiac catheterization, and she was advised that her cardiac symptoms were related to stressful situations.  She reveals further that she lost both of her parents during this year, and these have been very difficult for her to be.  As of the time that I saw the patient, she was completely pain-free.  Her EKG as showing in the emergency department, showed evidence of poor R wave progression.  Troponin levels have been within normal limits.  The patient is being ruled out for acute coronary event.    She denies fever, chills, nausea, vomiting, constipation, diarrhea, double vision, or blurred vision.    Past Medical History:  has a past medical history of Arthritis, Fibromyalgia, and Hypertension.    Past Surgical History:  has a past surgical history that includes Cardiac catheterization (N/A, 9/2/2019).    Family History: family history is not on file.    Social History:  reports that she has never smoked. She has never used smokeless tobacco. She reports that she does not drink alcohol or use drugs.    Allergies:   Allergies   Allergen Reactions   • Biaxin [Clarithromycin]    • Lovenox [Enoxaparin] Itching   • " "Penicillins    • Prednisone Anxiety and Mental Status Change     Patient states that she becomes very \"mean and irritable\".        Medications:   Prior to Admission medications    Medication Sig Start Date End Date Taking? Authorizing Provider   aspirin 81 MG EC tablet Take 1 tablet by mouth Daily. 9/5/19   Kade Oliveira MD   atorvastatin (LIPITOR) 20 MG tablet Take 1 tablet by mouth Every Night. 9/4/19   Kade Oliveira MD   carvedilol (COREG) 3.125 MG tablet Take 1 tablet by mouth Every 12 (Twelve) Hours. 9/4/19   Kade Oliveira MD   HYDROcodone-acetaminophen (NORCO) 7.5-325 MG per tablet Take 1 tablet by mouth Every 6 (Six) Hours As Needed for Moderate Pain .    ProviderAnita MD   losartan (COZAAR) 25 MG tablet Take 1 tablet by mouth Daily. 9/5/19   Kade Oliveira MD   omeprazole (priLOSEC) 40 MG capsule Take 40 mg by mouth Daily.    Provider, MD Anita   spironolactone (ALDACTONE) 25 MG tablet Take 0.5 tablets by mouth Daily. 9/5/19   Kade Oliveira MD       Review of Systems:  Review of Systems   Otherwise complete ROS is negative except as mentioned above.    Physical Exam:   Temp:  [97.8 °F (36.6 °C)] 97.8 °F (36.6 °C)  Heart Rate:  [64-84] 68  Resp:  [20-22] 20  BP: (142-214)/() 142/72  Physical exam:  Constitutional:  Well-developed and well-nourished.  No respiratory distress.      HENT:  Head:  Normocephalic and atraumatic.  Mouth:  Moist mucous membranes.  Poor dentition.   Eyes:  Conjunctivae and EOM are normal.  Pupils are equal, round, and reactive to light.  No scleral icterus.    Neck:  Neck supple.  No JVD present.    Cardiovascular:  Normal rate, regular rhythm and normal heart sounds with no murmur.  Pulmonary/Chest:  No respiratory distress, no wheezes, no crackles, with normal breath sounds and good air movement.  Abdominal:  Soft.  Bowel sounds are normal.  No distension and no tenderness.   Musculoskeletal:  No edema, no tenderness, and no deformity.  No " red or swollen joints anywhere.    Neurological:  Alert and oriented to person, place, and time.  No cranial nerve deficit.  No tongue deviation.  No facial droop.  No slurred speech.   Skin:  Skin is warm and dry. No rash noted. No pallor.   Peripheral vascular:  Strong pulses in all 4 extremities with no clubbing, no cyanosis, no edema.    Results Reviewed:  I have personally reviewed current lab, radiology, and data and agree with results.  Lab Results (last 24 hours)     Procedure Component Value Units Date/Time    Urinalysis With Microscopic If Indicated (No Culture) - Urine, Clean Catch [[id number]]  (Normal) Collected:  12/16/19 2308    Specimen:  Urine, Clean Catch Updated:  12/16/19 2322     Color, UA Yellow     Appearance, UA Clear     pH, UA 6.5     Specific Gravity, UA 1.007     Glucose, UA Negative     Ketones, UA Negative     Bilirubin, UA Negative     Blood, UA Negative     Protein, UA Negative     Leuk Esterase, UA Negative     Nitrite, UA Negative     Urobilinogen, UA 0.2 E.U./dL    Narrative:       Urine microscopic not indicated.    Extra Tubes [[id number]] Collected:  12/16/19 2012    Specimen:  Blood, Venous Line Updated:  12/16/19 2116    Narrative:       The following orders were created for panel order Extra Tubes.  Procedure                               Abnormality         Status                     ---------                               -----------         ------                     Light Blue Top[[id number]]                                   Final result               [facility][[id number]]                                   Final result                 Please view results for these tests on the individual orders.    [facility] [[id number]] Collected:  12/16/19 2012    Specimen:  Blood Updated:  12/16/19 2116     Extra Tube Hold for add-ons.     Comment: Auto resulted.       Light Blue Top [[id number]] Collected:  12/16/19 2012    Specimen:  Blood Updated:  12/16/19 2116     Extra Tube  hold for add-on     Comment: Auto resulted       Comprehensive Metabolic Panel [[id number]]  (Abnormal) Collected:  12/16/19 2008    Specimen:  Blood Updated:  12/16/19 2045     Glucose 100 mg/dL      BUN 5 mg/dL      Creatinine 0.67 mg/dL      Sodium 141 mmol/L      Potassium 2.7 mmol/L      Chloride 98 mmol/L      CO2 29.0 mmol/L      Calcium 9.2 mg/dL      Total Protein 8.1 g/dL      Albumin 4.20 g/dL      ALT (SGPT) 11 U/L      AST (SGOT) 15 U/L      Alkaline Phosphatase 113 U/L      Total Bilirubin 0.6 mg/dL      eGFR Non African Amer 91 mL/min/1.73      Globulin 3.9 gm/dL      A/G Ratio 1.1 g/dL      BUN/Creatinine Ratio 7.5     Anion Gap 14.0 mmol/L     Narrative:       GFR Normal >60  Chronic Kidney Disease <60  Kidney Failure <15      Troponin [[id number]]  (Normal) Collected:  12/16/19 2008    Specimen:  Blood Updated:  12/16/19 2033     Troponin T <0.010 ng/mL     Narrative:       Troponin T Reference Range:  <= 0.03 ng/mL-   Negative for AMI  >0.03 ng/mL-     Abnormal for myocardial necrosis.  Clinicians would have to utilize clinical acumen, EKG, Troponin and serial changes to determine if it is an Acute Myocardial Infarction or myocardial injury due to an underlying chronic condition.     CBC & Differential [[id number]] Collected:  12/16/19 2008    Specimen:  Blood Updated:  12/16/19 2016    Narrative:       The following orders were created for panel order CBC & Differential.  Procedure                               Abnormality         Status                     ---------                               -----------         ------                     CBC Auto Differential[[id number]]        Normal              Final result                 Please view results for these tests on the individual orders.    CBC Auto Differential [[id number]]  (Normal) Collected:  12/16/19 2008    Specimen:  Blood Updated:  12/16/19 2016     WBC 9.89 10*3/mm3      RBC 4.85 10*6/mm3      Hemoglobin 13.5 g/dL      Hematocrit 39.2 %       MCV 80.8 fL      MCH 27.8 pg      MCHC 34.4 g/dL      RDW 13.7 %      RDW-SD 40.0 fl      MPV 9.7 fL      Platelets 327 10*3/mm3      Neutrophil % 68.0 %      Lymphocyte % 23.6 %      Monocyte % 5.1 %      Eosinophil % 2.8 %      Basophil % 0.3 %      Immature Grans % 0.2 %      Neutrophils, Absolute 6.73 10*3/mm3      Lymphocytes, Absolute 2.33 10*3/mm3      Monocytes, Absolute 0.50 10*3/mm3      Eosinophils, Absolute 0.28 10*3/mm3      Basophils, Absolute 0.03 10*3/mm3      Immature Grans, Absolute 0.02 10*3/mm3      nRBC 0.0 /100 WBC         Imaging Results (Last 24 Hours)     Procedure Component Value Units Date/Time    CT Angiogram Chest [[id number]] Collected:  12/16/19 2114     Updated:  12/16/19 2150    Narrative:         CT angiogram chest with contrast on 12/16/2019     CLINICAL INDICATION: Chest pain radiating to back    TECHNIQUE: Multiple axial images are obtained throughout the  chest following the administration of IV contrast.  Computer  generated 3D reconstructions/MIPS were performed. This exam was  performed according to our departmental dose-optimization  program, which includes automated exposure control, adjustment of  the mA and/or kV according to patient size and/or use of  iterative reconstruction technique.   Total DLP is 163.9 mGy*cm.    COMPARISON: 1/28/2015     FINDINGS: Right renal cortical scarring is partially imaged.  There is likely at least mild right hydronephrosis partially  imaged but no definite etiology. The patient is status post  cholecystectomy. Limited visualized upper abdomen is otherwise  unremarkable. There is no pleural or pericardial effusion. There  is evidence of calcified granulomatous disease in the chest.  There is no thoracic aortic aneurysm or dissection. There are no  filling defects within the pulmonary arteries to suggest a  pulmonary embolus. There is minimal linear atelectasis or  scarring in the left lung base. The lungs are otherwise clear. No  acute  bony abnormality is noted.      Impression:       1. No evidence of pulmonary embolus.  2. Possible partially imaged right hydronephrosis, consider  follow-up renal ultrasound or CT abdomen and pelvis to better  evaluate.    Electronically signed by:  Aaron Elder  12/16/2019 9:49 PM  CST Workstation: [id number]        Assessment:    Active Hospital Problems    Diagnosis   • Chest pain       Plan:  Admit to the ACS protocol  Obtain serial troponin levels  Reconcile home medications  Administer analgesics PRN for pain control  The patient was counseled to avoid stressful situations as much as possible.  And to react to unavoidable stressful situations in a less agitated manner.    I discussed the patients findings and my recommendations with the patient.      Shahzad Holland MD  12/16/19  11:31 PM

## 2019-12-17 NOTE — ED PROVIDER NOTES
"Subjective   59-year-old white female presents to the emergency department with chief complaint of chest pain.  Patient complains of chest pain that radiates to her jaw, left arm and back that started approximately 2 hours ago.  Patient relates she felt short of breath, nauseated and was diaphoretic.  She took 1 sublingual nitroglycerin with some improvement.  Patient's cardiac risk factors include hypertension and family history of heart disease.  Patient was admitted to [facility] in September 2019 with non-STEMI.  Cardiac catheterization demonstrated nonobstructive coronary artery disease.          Review of Systems   Constitutional: Positive for diaphoresis. Negative for chills and fever.   Respiratory: Positive for shortness of breath.    Cardiovascular: Positive for chest pain.   Gastrointestinal: Positive for nausea. Negative for abdominal pain, blood in stool and vomiting.   Genitourinary: Negative for dysuria and hematuria.   Musculoskeletal: Positive for back pain. Negative for neck pain.   Neurological: Negative for syncope, weakness, numbness and headaches.   All other systems reviewed and are negative.      Past Medical History:   Diagnosis Date   • Arthritis    • Fibromyalgia    • Hypertension        Allergies   Allergen Reactions   • Biaxin [Clarithromycin]    • Lovenox [Enoxaparin] Itching   • Penicillins    • Prednisone Anxiety and Mental Status Change     Patient states that she becomes very \"mean and irritable\".        Past Surgical History:   Procedure Laterality Date   • CARDIAC CATHETERIZATION N/A 9/2/2019    Procedure: Left Heart Cath/ PCI if indicated;  Surgeon: Laura Aleman MD;  Location: [facility] INVASIVE LOCATION;  Service: Cardiovascular       No family history on file.    Social History     Socioeconomic History   • Marital status:      Spouse name: Not on file   • Number of children: Not on file   • Years of education: Not on file   • Highest " education level: Not on file   Tobacco Use   • Smoking status: Never Smoker   • Smokeless tobacco: Never Used   Substance and Sexual Activity   • Alcohol use: No   • Drug use: No           Objective   Physical Exam   Constitutional: She is oriented to person, place, and time. She appears well-developed and well-nourished. No distress.   HENT:   Head: Normocephalic and atraumatic.   Right Ear: External ear normal.   Left Ear: External ear normal.   Nose: Nose normal.   Mouth/Throat: Oropharynx is clear and moist.   Eyes: Pupils are equal, round, and reactive to light. Conjunctivae and EOM are normal.   Neck: Normal range of motion. Neck supple.   Cardiovascular: Normal rate, regular rhythm, normal heart sounds and intact distal pulses.   Pulmonary/Chest: Effort normal and breath sounds normal.   Abdominal: Soft. Bowel sounds are normal. She exhibits no distension and no mass. There is no tenderness. There is no guarding.   Musculoskeletal: Normal range of motion. She exhibits no edema or tenderness.   Neurological: She is alert and oriented to person, place, and time. No cranial nerve deficit or sensory deficit. She exhibits normal muscle tone.   Skin: Skin is warm and dry. She is not diaphoretic.   Psychiatric: She has a normal mood and affect. Her behavior is normal.   Nursing note and vitals reviewed.      ECG 12 Lead    Date/Time: 12/16/2019 6:36 PM  Performed by: Hansel Mckeon MD  Authorized by: Hansel Mckeon MD   Interpreted by physician  Clinical impression: abnormal ECG  Comments: Normal sinus rhythm rate of 83.  Baseline artifact.  No ST elevation.  Poor R wave progression.  Nonspecific findings.                 ED Course  ED Course as of Dec 16 2259   Mon Dec 16, 2019   2234 Patient is alert and resting comfortably.  Her chest pain was relieved with nitroglycerin and she was treated with aspirin.  Her EKG is nondiagnostic and her first troponin is within normal limits.  CT angiogram of the chest was  negative for PE or aortic dissection.  Patient has history of non-STEMI in September and has nonobstructive coronary artery disease.  Patient's heart risk score is 5.  I reviewed the results of her evaluation with her and recommended admission.  I will page the hospitalist.    []   2256 Case discussed with the hospitalist Dr. Holland and he agrees to admit to telemetry.    []      ED Course User Index  [] Hansel Mckeon MD            Labs Reviewed   COMPREHENSIVE METABOLIC PANEL - Abnormal; Notable for the following components:       Result Value    Glucose 100 (*)     BUN 5 (*)     Potassium 2.7 (*)     All other components within normal limits    Narrative:     GFR Normal >60  Chronic Kidney Disease <60  Kidney Failure <15     TROPONIN (IN-HOUSE) - Normal    Narrative:     Troponin T Reference Range:  <= 0.03 ng/mL-   Negative for AMI  >0.03 ng/mL-     Abnormal for myocardial necrosis.  Clinicians would have to utilize clinical acumen, EKG, Troponin and serial changes to determine if it is an Acute Myocardial Infarction or myocardial injury due to an underlying chronic condition.    CBC WITH AUTO DIFFERENTIAL - Normal   URINALYSIS W/ MICROSCOPIC IF INDICATED (NO CULTURE)   CBC AND DIFFERENTIAL    Narrative:     The following orders were created for panel order CBC & Differential.  Procedure                               Abnormality         Status                     ---------                               -----------         ------                     CBC Auto Differential[[id number]]        Normal              Final result                 Please view results for these tests on the individual orders.   EXTRA TUBES    Narrative:     The following orders were created for panel order Extra Tubes.  Procedure                               Abnormality         Status                     ---------                               -----------         ------                     Light Blue Top[[id number]]                                    Final result               [facility][[id number]]                                   Final result                 Please view results for these tests on the individual orders.   LIGHT BLUE TOP   [facility]     Ct Angiogram Chest    Result Date: 12/16/2019  Narrative: CT angiogram chest with contrast on 12/16/2019 CLINICAL INDICATION: Chest pain radiating to back TECHNIQUE: Multiple axial images are obtained throughout the chest following the administration of IV contrast.  Computer generated 3D reconstructions/MIPS were performed. This exam was performed according to our departmental dose-optimization program, which includes automated exposure control, adjustment of the mA and/or kV according to patient size and/or use of iterative reconstruction technique. Total DLP is 163.9 mGy*cm. COMPARISON: 1/28/2015 FINDINGS: Right renal cortical scarring is partially imaged. There is likely at least mild right hydronephrosis partially imaged but no definite etiology. The patient is status post cholecystectomy. Limited visualized upper abdomen is otherwise unremarkable. There is no pleural or pericardial effusion. There is evidence of calcified granulomatous disease in the chest. There is no thoracic aortic aneurysm or dissection. There are no filling defects within the pulmonary arteries to suggest a pulmonary embolus. There is minimal linear atelectasis or scarring in the left lung base. The lungs are otherwise clear. No acute bony abnormality is noted.     Impression: 1. No evidence of pulmonary embolus. 2. Possible partially imaged right hydronephrosis, consider follow-up renal ultrasound or CT abdomen and pelvis to better evaluate. Electronically signed by:  Aaron Elder  12/16/2019 9:49 PM CST Workstation: [id number]               No data recorded                      HEART Score (for prediction of 6-week risk of major adverse cardiac event) reviewed and/or performed as part of the patient  evaluation and treatment planning process.  The result associated with this review/performance is: 5       MDM    Final diagnoses:   Chest pain, unspecified type   Hypokalemia              Hansel Mckeon MD  12/16/19 3552

## 2019-12-17 NOTE — DISCHARGE SUMMARY
Discharge Summary  Sergio Griffin MD  Hospitalist     Date of Discharge:  12/17/2019    Discharge Diagnosis:      Chest pain    Hypertensive urgency    Chronic systolic CHF (congestive heart failure) (CMS/[facility])      Presenting Problem/History of Present Illness  Chest pain    Hospital Course  Patient is a 59 y.o. female admitted for chest pain / pressure and a blood pressure of 215/105. Her symptoms improved with symptomatic treatment and by improving her BP values. She had normal coronaries on a cardiac cath obtained in September when she was diagnosed with Takotsubo cardiomyopathy. Her LVEF is around 40 to 45%, her Troponin values are negative and the ECG tracings show only nonspecific changes.    She will follow with her primary care provider within a week and with her cardiologist as scheduled.    Consults:   Consults     No orders found for last 30 day(s).          Pertinent Test Results: normal coronaries on an angiogram obtained in September '19    Condition on Discharge:  stable    Vital Signs  Temp:  [97.8 °F (36.6 °C)-98.7 °F (37.1 °C)] 98.7 °F (37.1 °C)  Heart Rate:  [56-84] 60  Resp:  [18-22] 18  BP: (116-214)/() 116/62    Physical Exam:  Physical Exam   Constitutional: She is oriented to person, place, and time. She appears well-developed and well-nourished.   HENT:   Head: Normocephalic and atraumatic.   Eyes: Pupils are equal, round, and reactive to light. EOM are normal. No scleral icterus.   Neck: Normal range of motion. Neck supple.   Cardiovascular: Normal rate and regular rhythm.   Pulmonary/Chest: Effort normal and breath sounds normal. No stridor. No respiratory distress.   Abdominal: Soft. Bowel sounds are normal. She exhibits no distension. There is no tenderness.   Musculoskeletal: Normal range of motion. She exhibits no edema or tenderness.   Neurological: She is alert and oriented to person, place, and time. No cranial nerve deficit. Coordination normal.   Skin: Skin is warm and dry.  No rash noted. No erythema. There is pallor.   Psychiatric: She has a normal mood and affect. Her behavior is normal.   Vitals reviewed.      Discharge Disposition  Home or Self Care    Discharge Medications     Discharge Medications      New Medications      Instructions Start Date   acetaminophen 325 MG tablet  Commonly known as:  TYLENOL   650 mg, Oral, Every 4 Hours PRN         Continue These Medications      Instructions Start Date   aspirin 81 MG EC tablet   81 mg, Oral, Daily      atorvastatin 20 MG tablet  Commonly known as:  LIPITOR   20 mg, Oral, Nightly      carvedilol 3.125 MG tablet  Commonly known as:  COREG   3.125 mg, Oral, Every 12 Hours Scheduled      HYDROcodone-acetaminophen 7.5-325 MG per tablet  Commonly known as:  NORCO   1 tablet, Oral, Every 6 Hours PRN      losartan 25 MG tablet  Commonly known as:  COZAAR   25 mg, Oral, Daily      omeprazole 40 MG capsule  Commonly known as:  priLOSEC   40 mg, Oral, Daily      spironolactone 25 MG tablet  Commonly known as:  ALDACTONE   12.5 mg, Oral, Daily             Discharge Diet:   Diet Instructions     Diet: Cardiac, Regular      Discharge Diet:   Cardiac  Regular             Activity at Discharge:   Activity Instructions     Activity as Tolerated            Follow-up Appointments  No future appointments.  Additional Instructions for the Follow-ups that You Need to Schedule     Discharge Follow-up with PCP   As directed       Currently Documented PCP:    Lianna Dahl APRN    PCP Phone Number:    [phone]     Follow Up Details:  in 1 week                Sergio Griffin MD  12/17/19  5:58 PM

## 2019-12-18 ENCOUNTER — READMISSION MANAGEMENT (OUTPATIENT)
Dept: CALL CENTER | Facility: HOSPITAL | Age: 59
End: 2019-12-18

## 2019-12-18 NOTE — OUTREACH NOTE
Prep Survey      Responses   Facility patient discharged from?  [place]   Is patient eligible?  No   What are the reasons patient is not eligible?  Other [CHF is chronic admitted for chest pain]   Does the patient have one of the following disease processes/diagnoses(primary or secondary)?  Other   Prep survey completed?  Yes          Cathi Brand RN

## 2020-03-19 ENCOUNTER — TRANSCRIBE ORDERS (OUTPATIENT)
Dept: FAMILY MEDICINE CLINIC | Facility: CLINIC | Age: 60
End: 2020-03-19

## 2020-03-19 DIAGNOSIS — M54.9 BACK PAIN, UNSPECIFIED BACK LOCATION, UNSPECIFIED BACK PAIN LATERALITY, UNSPECIFIED CHRONICITY: Primary | ICD-10-CM

## 2020-04-07 RX ORDER — HYDROCODONE BITARTRATE AND ACETAMINOPHEN 7.5; 325 MG/1; MG/1
TABLET ORAL
Qty: 60 TABLET | Refills: 0 | OUTPATIENT
Start: 2020-04-07

## 2020-10-06 ENCOUNTER — TRANSCRIBE ORDERS (OUTPATIENT)
Dept: FAMILY MEDICINE CLINIC | Facility: CLINIC | Age: 60
End: 2020-10-06

## 2020-10-06 DIAGNOSIS — M54.41 LOW BACK PAIN WITH RIGHT-SIDED SCIATICA, UNSPECIFIED BACK PAIN LATERALITY, UNSPECIFIED CHRONICITY: Primary | ICD-10-CM

## 2020-10-14 ENCOUNTER — HOSPITAL ENCOUNTER (OUTPATIENT)
Dept: PHYSICAL THERAPY | Facility: HOSPITAL | Age: 60
Setting detail: THERAPIES SERIES
Discharge: HOME | End: 2020-10-14

## 2020-10-14 DIAGNOSIS — M54.41 LOW BACK PAIN WITH RIGHT-SIDED SCIATICA, UNSPECIFIED BACK PAIN LATERALITY, UNSPECIFIED CHRONICITY: Primary | ICD-10-CM

## 2020-10-14 PROCEDURE — 97162 PT EVAL MOD COMPLEX 30 MIN: CPT

## 2020-10-14 NOTE — THERAPY EVALUATION
"    Outpatient Physical Therapy Ortho Initial Evaluation  [facility]     Patient Name: Denise Mulligan  : 1960  MRN: [id number]  Today's Date: 10/14/2020      Visit Date: 10/14/2020     ATTENDANCE:   SUBJECTIVE IMPROVEMENT: n/a  NEXT MD APPOINTMENT: TBD  RECERT DATE: 2020    THERAPY DIAGNOSIS: Low back pain with radiating pain and neck pain       Patient Active Problem List   Diagnosis   • NSTEMI (non-ST elevated myocardial infarction) (CMS/[facility])   • Chest pain   • Hypertensive urgency   • Chronic systolic CHF (congestive heart failure) (CMS/[facility])        Past Medical History:   Diagnosis Date   • Arthritis    • Fibromyalgia    • Hypertension    • MI (myocardial infarction) (CMS/[facility]) 2019    was Pre MI 2019.          Past Surgical History:   Procedure Laterality Date   • CARDIAC CATHETERIZATION N/A 2019    Procedure: Left Heart Cath/ PCI if indicated;  Surgeon: Laura Aleman MD;  Location: [facility] CATH INVASIVE LOCATION;  Service: Cardiovascular       Visit Dx:     ICD-10-CM ICD-9-CM   1. Low back pain with right-sided sciatica, unspecified back pain laterality, unspecified chronicity  M54.41 724.3         Patient History     Row Name 10/14/20 1300             History    Chief Complaint  Pain  -AC      Type of Pain  Back pain;Neck pain  -AC      Brief Description of Current Complaint  The pt reports that back pain has been going on for 20- something years. She notes that she has difficulty sleeping because of the pain. Notes back pain is constant, but any bending, walking, or standing causes increased pain. She notes that the L hip swells up very badly and if she notices it is swelling then she has to sit down because she cant walk anymore. She reports that pain in the neck is more with movement, she notes stiffness/\"ripping\" feeling, notes MD did x-ray and stated it was just old age/degenerative changes. Neck pain has been present for a couple of months. notes she quit " her job due the demand on her body with lifting/standing all day. She reports that she lives with  who helps out as needed. 3 stairs to enter with a handrail with a tub shower.   -AC      Previous treatment for THIS PROBLEM  Rehabilitation reports PT years ago that increased leg pain.   -AC      Patient/Caregiver Goals  Relieve pain;Return to prior level of function  -AC      Current Tobacco Use  no  -AC      Patient's Rating of General Health  Fair  -AC      Occupation/sports/leisure activities  unemployed.   -AC      How has patient tried to help current problem?  Pain medication.   -AC      What clinical tests have you had for this problem?  X-ray  -AC      Results of Clinical Tests  degenerative changes noted.   -AC         Pain     Pain Location  Neck;Back  -AC      Pain at Present  7 neck and back  -AC      Pain at Best  4 neck with rest and back with pain medication   -AC      Pain at Worst  10  -AC      Pain Frequency  Constant/continuous  -AC      Pain Description  -- pulling/tearing in the neck. sharp/pressure in back  -AC      What Performance Factors Make the Current Problem(s) WORSE?  moving neck.  or standing/walking   -AC      What Performance Factors Make the Current Problem(s) BETTER?  pain medication.   -AC      Is your sleep disturbed?  Yes  -AC      Is medication used to assist with sleep?  Yes helps fall asleep but then pain wakes right back up   -AC      Difficulties at work?  n/a  -AC      Difficulties with ADL's?  yes- any bending.   -AC         Fall Risk Assessment    Any falls in the past year:  No  -AC         Daily Activities    Primary Language  English  -AC        User Key  (r) = Recorded By, (t) = Taken By, (c) = Cosigned By    Initials Name Provider Type    AC Alley Tierney, PT Physical Therapist          PT Ortho     Row Name 10/14/20 1300       Subjective Comments    Subjective Comments  see pt hx.   -AC       Precautions and Contraindications    Precautions/Limitations   no known precautions/limitations  -AC       Subjective Pain    Able to rate subjective pain?  yes  -AC    Pre-Treatment Pain Level  4  -AC       Posture/Observations    Posture/Observations Comments  forward head and rounded shoulder. antalgic gait pattern with forward lean. R lateral lean when sitting to off weight L hip. Hypomobile and painful PA glides L3/4/5. tender points through glutes ITB and low back on both sides. tender throughout upper traps, Sub occipitals, middle traps, SCM, and scalenes with trigger points throughout. tightness noted in upper traps and SCM at this time. Decreased OA flexion bilaterally.   -AC       General ROM    GENERAL ROM COMMENTS  hips and knees WNL. SLR limited 75% with pain. prone quads limited and painful. Neck PROM painful however WNL.   -AC       MMT (Manual Muscle Testing)    General MMT Comments  Painful with all testing. L>R. 3+/5 LLE, 4-/5 RLE.   -AC       Sensation    Sensation WNL?  WNL  -AC    Light Touch  No apparent deficits  -AC    Additional Comments  light touch intact BLE.   -AC      User Key  (r) = Recorded By, (t) = Taken By, (c) = Cosigned By    Initials Name Provider Type    AC Alley Tierney, PT Physical Therapist                      Therapy Education  Education Details: AROM and heat for neck. HS/ITB/QL stretches and adduction/abduction for LE strength  Given: HEP  Program: New  How Provided: Verbal, Demonstration, Written  Provided to: Patient  Level of Understanding: Verbalized     PT OP Goals     Row Name 10/14/20 1800          PT Short Term Goals    STG Date to Achieve  11/11/20  -     STG 1  independent with HEP.   -AC     STG 1 Progress  New  -     STG 2  Pt to have no trigger point tenderness along cervical musculature  -AC     STG 2 Progress  New  -AC     STG 3  OA flexion WNL bilaterally.   -AC     STG 3 Progress  New  -     STG 4  Pt to have improved trunk ROM to WNL.   -AC     STG 4 Progress  New  -        Long Term Goals    LTG Date  to Achieve  12/09/20  -AC     LTG 1  Pt to have improved BLE MMT to 4+/5.   -AC     LTG 1 Progress  New  -AC     LTG 2  Pt to have improved modified Oswestery to <20/50 or 40%.   -AC     LTG 2 Progress  New  -AC     LTG 3  Pt to be able to walk 1200 ft in 6 MWT.   -AC     LTG 3 Progress  New  -AC     LTG 4  Pt to have subjective improvement of 80% or greater.   -AC     LTG 4 Progress  New  -AC        Time Calculation    PT Goal Re-Cert Due Date  11/04/20  -AC       User Key  (r) = Recorded By, (t) = Taken By, (c) = Cosigned By    Initials Name Provider Type    AC Alley Tierney, PT Physical Therapist          PT Assessment/Plan     Row Name 10/14/20 1800          PT Assessment    Functional Limitations  Decreased safety during functional activities;Impaired gait;Impaired locomotion;Limitation in home management;Limitations in community activities;Limitations in functional capacity and performance;Performance in leisure activities;Performance in self-care ADL  -AC     Impairments  Balance;Coordination;Endurance;Gait;Impaired flexibility;Impaired muscle length;Joint mobility;Muscle strength;Pain;Posture  -AC     Assessment Comments  the pt is a 59 y/o female who presents to PT with 20 year hx of back pain. she presents with limited ROM in hips with increase in back and hip pain upon PROM. She has limited SLR to about 45 degrees due to pain and limited prone quad stretch to about 80 degrees due to pain. She has tenderness noted along glue=jesse, ITB, and paraspinals with more tenderness along the L side than the R. She has significantly decreased BLE MMT of 3+/5 in all planes and increased pain with all testing. Her neck musculature shows increased tightness however ROM is WNL for all planes except OA flexionis limited. She has TTP over the upper traps and suboccipitals which causes increase in pain and headaches. She will benefit from skilled PT to improve her strength and flexibility to decrease pain and improve  QOL.   -AC     Please refer to paper survey for additional self-reported information  Yes  -AC     Rehab Potential  Fair  -AC     Patient/caregiver participated in establishment of treatment plan and goals  Yes  -AC     Patient would benefit from skilled therapy intervention  Yes  -AC        PT Plan    PT Frequency  2x/week  -AC     Predicted Duration of Therapy Intervention (PT)  6-8 weeks   -AC     Planned CPT's?  PT EVAL MOD COMPLELITY: 23131;PT THER PROC EA 15 MIN: 57774;PT THER ACT EA 15 MIN: 17662;PT MANUAL THERAPY EA 15 MIN: 05146;PT NEUROMUSC RE-EDUCATION EA 15 MIN: 60949;PT GAIT TRAINING EA 15 MIN: 97152;PT HOT OR COLD PACK TREAT MCARE;PT ELECTRICAL STIM UNATTEND: ;PT TRACTION LUMBAR: 22854  -AC     PT Plan Comments  trunk ROM/stretching, core strengthening. BLE stretching/stretching. manual for cervical tightness as tolerated. manual and modalities as needed for low back pain as well   -AC       User Key  (r) = Recorded By, (t) = Taken By, (c) = Cosigned By    Initials Name Provider Type    AC Alley Tierney, PT Physical Therapist            OP Exercises     Row Name 10/14/20 1300             Subjective Comments    Subjective Comments  see pt hx.   -AC         Subjective Pain    Able to rate subjective pain?  yes  -AC      Pre-Treatment Pain Level  4  -AC      Post-Treatment Pain Level  6  -AC         Exercise 1    Exercise Name 1  heat for neck   -AC      Additional Comments  HEP   -AC         Exercise 2    Exercise Name 2  gentle AROM neck   -AC      Additional Comments  HEP   -AC         Exercise 3    Exercise Name 3  HS stretch   -AC      Additional Comments  HEP   -AC         Exercise 4    Exercise Name 4  QL/ITB stretch   -AC      Additional Comments  HEP   -AC         Exercise 5    Exercise Name 5  clamshells  -AC      Additional Comments  HEP   -AC         Exercise 6    Exercise Name 6  hip adduction squeezes  -AC      Additional Comments  HEP  -AC        User Key  (r) = Recorded By, (t) =  Taken By, (c) = Cosigned By    Initials Name Provider Type    AC Alley Tierney, PT Physical Therapist                        Outcome Measure Options: Modifed Owestry  Modified Oswestry  Modified Oswestry Score/Comments: 62%; 31/50       Time Calculation:     Start Time: 1302  Stop Time: 1342  Time Calculation (min): 40 min     Therapy Charges for Today     Code Description Service Date Service Provider Modifiers Qty    37693367326 HC PT EVAL MOD COMPLEXITY 3 10/14/2020 Alley Tierney, PT GP 1                  Alley Tierney, PT  10/14/2020

## 2020-10-21 ENCOUNTER — HOSPITAL ENCOUNTER (OUTPATIENT)
Dept: PHYSICAL THERAPY | Facility: HOSPITAL | Age: 60
Setting detail: THERAPIES SERIES
Discharge: HOME | End: 2020-10-21

## 2020-10-21 DIAGNOSIS — M54.41 LOW BACK PAIN WITH RIGHT-SIDED SCIATICA, UNSPECIFIED BACK PAIN LATERALITY, UNSPECIFIED CHRONICITY: Primary | ICD-10-CM

## 2020-10-21 PROCEDURE — G0283 ELEC STIM OTHER THAN WOUND: HCPCS

## 2020-10-21 PROCEDURE — 97110 THERAPEUTIC EXERCISES: CPT

## 2020-10-21 NOTE — THERAPY TREATMENT NOTE
Outpatient Physical Therapy Ortho Treatment Note  [facility]     Patient Name: Denise Mulligan  : 1960  MRN: [id number]  Today's Date: 10/21/2020      Visit Date: 10/21/2020     Subjective Improvement 0  Visits 2/2  Visits approved 10 from 10- to 2020  RTMD PRN  Recert Date 2020    Low back pain    Visit Dx:    ICD-10-CM ICD-9-CM   1. Low back pain with right-sided sciatica, unspecified back pain laterality, unspecified chronicity  M54.41 724.3       Patient Active Problem List   Diagnosis   • NSTEMI (non-ST elevated myocardial infarction) (CMS/[facility])   • Chest pain   • Hypertensive urgency   • Chronic systolic CHF (congestive heart failure) (CMS/[facility])        Past Medical History:   Diagnosis Date   • Arthritis    • Fibromyalgia    • Hypertension    • MI (myocardial infarction) (CMS/[facility]) 2019    was Pre MI 2019.          Past Surgical History:   Procedure Laterality Date   • CARDIAC CATHETERIZATION N/A 2019    Procedure: Left Heart Cath/ PCI if indicated;  Surgeon: Laura Aleman MD;  Location: [facility] CATH INVASIVE LOCATION;  Service: Cardiovascular                       PT Assessment/Plan     Row Name 10/21/20 1445          PT Assessment    Assessment Comments  Patient is TTP to right sacral area.  ME to correct right sacral rotation did not completely correct rotation to neutral.  No SI rotation noted this date  -CP        PT Plan    PT Frequency  2x/week  -CP     PT Plan Comments  6-8 weeks. LE strengthening  -CP       User Key  (r) = Recorded By, (t) = Taken By, (c) = Cosigned By    Initials Name Provider Type    CP Chary Small, PTA Physical Therapy Assistant          Modalities     Row Name 10/21/20 1300             Moist Heat    MH Applied  Yes  -CP      Location  low back with IFC  -CP      Rx Minutes  -- 20  -CP      MH S/P Rx  Yes  -CP         ELECTRICAL STIMULATION    Attended/Unattended  Unattended  -CP      Stimulation Type  IFC  -CP       "Location/Electrode Placement/Other  low back with MHP  -CP       PT E-Stim Unattended (Manual) Minutes  -- 20  -CP        User Key  (r) = Recorded By, (t) = Taken By, (c) = Cosigned By    Initials Name Provider Type    Chary Mackenzie PTA Physical Therapy Assistant        OP Exercises     Row Name 10/21/20 1300             Subjective Comments    Subjective Comments  Patient reports increase pain today.  she reports decrease when laying flat with legs straight out.  -CP         Subjective Pain    Able to rate subjective pain?  yes  -CP      Pre-Treatment Pain Level  7  -CP      Post-Treatment Pain Level  8  -CP         Exercise 1    Exercise Name 1  pro II level 1  -CP      Time 1  10  -CP         Exercise 2    Exercise Name 2  LTR stretch  -CP      Cueing 2  Verbal;Tactile  -CP      Reps 2  20  -CP         Exercise 3    Exercise Name 3  bridgin  -CP      Cueing 3  Verbal;Tactile  -CP      Sets 3  2  -CP      Reps 3  10  -CP         Exercise 4    Exercise Name 4  Prone on elbows  -CP      Cueing 4  Verbal;Tactile  -CP      Sets 4  1  -CP      Reps 4  10  -CP      Time 4  10\" holds  -CP         Exercise 5    Exercise Name 5  Prone press up  -CP      Cueing 5  Verbal;Tactile  -CP      Sets 5  1  -CP      Reps 5  10  -CP      Time 5  10\"  -CP         Exercise 6    Exercise Name 6  prone glut squeezes  -CP      Cueing 6  Verbal  -CP      Sets 6  2  -CP      Reps 6  10  -CP      Time 6  5\" holds  -CP         Exercise 7    Exercise Name 7  see manual  -CP         Exercise 8    Exercise Name 8  Prone heel squeezes  -CP      Cueing 8  Verbal;Tactile  -CP      Sets 8  2  -CP      Reps 8  10  -CP      Time 8  10\" holds  -CP         Exercise 9    Exercise Name 9  gait in clinic  -CP      Reps 9  270 ft  -CP        User Key  (r) = Recorded By, (t) = Taken By, (c) = Cosigned By    Initials Name Provider Type    Chary Mackenzie PTA Physical Therapy Assistant                      Manual Rx (last 36 hours)    "   Manual Treatments     Row Name 10/21/20 1300             Manual Rx 1    Manual Rx 1 Location  Right Sacral  -CP      Manual Rx 1 Type  ME to corect rotation  -CP      Manual Rx 1 Duration  3  -CP        User Key  (r) = Recorded By, (t) = Taken By, (c) = Cosigned By    Initials Name Provider Type    Chary Mackenzie, JOB Physical Therapy Assistant          PT OP Goals     Row Name 10/21/20 1400          PT Short Term Goals    STG Date to Achieve  11/11/20  -CP     STG 1  independent with HEP.   -CP     STG 1 Progress  Ongoing  -CP     STG 2  Pt to have no trigger point tenderness along cervical musculature  -CP     STG 2 Progress  Not Met  -CP     STG 3  OA flexion WNL bilaterally.   -CP     STG 3 Progress  Progressing  -CP     STG 4  Pt to have improved trunk ROM to WNL.   -CP     STG 4 Progress  Progressing  -CP        Long Term Goals    LTG Date to Achieve  12/09/20  -CP     LTG 1  Pt to have improved BLE MMT to 4+/5.   -CP     LTG 1 Progress  Progressing  -CP     LTG 2  Pt to have improved modified oswestery to <20/50 or 40%.   -CP     LTG 2 Progress  Progressing  -CP     LTG 3  Pt to be able to walk 1200 ft in 6 MWT.   -CP     LTG 3 Progress  Progressing  -CP     LTG 4  Pt to have subjective improvement of 80% or greater.   -CP     LTG 4 Progress  Not Met  -CP        Time Calculation    PT Goal Re-Cert Due Date  11/04/20  -CP       User Key  (r) = Recorded By, (t) = Taken By, (c) = Cosigned By    Initials Name Provider Type    Chary Mackenzie PTA Physical Therapy Assistant          Therapy Education  Education Details: LTR, bridging, prone on elbows, prone press up, prone heel squeezes, prone glut squeezes  Given: HEP  Program: New  How Provided: Verbal, Demonstration, Written  Provided to: Patient  Level of Understanding: Teach back education performed, Verbalized, Demonstrated              Time Calculation:   Start Time: 1345  Stop Time: 1450  Time Calculation (min): 65 min  Total Timed Code  Minutes- PT: 42 minute(s)  Therapy Charges for Today     Code Description Service Date Service Provider Modifiers Qty    72784509350 HC PT ELECTRICAL STIM UNATTENDED 10/21/2020 Chary Small, PTA  1    68811100025 HC PT THER PROC EA 15 MIN 10/21/2020 Chary Small, PTA GP 3                    Chary Small, JOB  10/21/2020

## 2020-10-27 ENCOUNTER — DOCUMENTATION (OUTPATIENT)
Dept: PHYSICAL THERAPY | Facility: HOSPITAL | Age: 60
End: 2020-10-27

## 2020-10-28 ENCOUNTER — APPOINTMENT (OUTPATIENT)
Dept: PHYSICAL THERAPY | Facility: HOSPITAL | Age: 60
End: 2020-10-28

## 2020-10-30 ENCOUNTER — APPOINTMENT (OUTPATIENT)
Dept: PHYSICAL THERAPY | Facility: HOSPITAL | Age: 60
End: 2020-10-30

## 2021-11-04 ENCOUNTER — TRANSCRIBE ORDERS (OUTPATIENT)
Dept: ORTHOPEDIC SURGERY | Facility: CLINIC | Age: 61
End: 2021-11-04

## 2021-11-04 DIAGNOSIS — M25.511 ACUTE PAIN OF RIGHT SHOULDER: Primary | ICD-10-CM

## 2021-11-30 ENCOUNTER — OFFICE VISIT (OUTPATIENT)
Dept: ORTHOPEDIC SURGERY | Facility: CLINIC | Age: 61
End: 2021-11-30

## 2021-11-30 VITALS — WEIGHT: 165 LBS | BODY MASS INDEX: 33.26 KG/M2 | HEIGHT: 59 IN

## 2021-11-30 DIAGNOSIS — M25.511 CHRONIC RIGHT SHOULDER PAIN: ICD-10-CM

## 2021-11-30 DIAGNOSIS — M75.41 IMPINGEMENT SYNDROME OF RIGHT SHOULDER: ICD-10-CM

## 2021-11-30 DIAGNOSIS — G89.29 CHRONIC RIGHT SHOULDER PAIN: Primary | ICD-10-CM

## 2021-11-30 DIAGNOSIS — M25.511 CHRONIC RIGHT SHOULDER PAIN: Primary | ICD-10-CM

## 2021-11-30 DIAGNOSIS — G89.29 CHRONIC RIGHT SHOULDER PAIN: ICD-10-CM

## 2021-11-30 DIAGNOSIS — M19.011 ARTHRITIS OF RIGHT ACROMIOCLAVICULAR JOINT: Primary | ICD-10-CM

## 2021-11-30 PROCEDURE — 99204 OFFICE O/P NEW MOD 45 MIN: CPT | Performed by: NURSE PRACTITIONER

## 2021-11-30 RX ORDER — NAPROXEN 500 MG/1
500 TABLET ORAL 2 TIMES DAILY WITH MEALS
COMMUNITY
End: 2021-12-01

## 2021-12-01 ENCOUNTER — PATIENT ROUNDING (BHMG ONLY) (OUTPATIENT)
Dept: ORTHOPEDIC SURGERY | Facility: CLINIC | Age: 61
End: 2021-12-01

## 2021-12-01 RX ORDER — LIDOCAINE AND PRILOCAINE 25; 25 MG/G; MG/G
CREAM TOPICAL ONCE
Qty: 1 EACH | Refills: 1 | Status: SHIPPED | OUTPATIENT
Start: 2021-12-01 | End: 2022-01-18

## 2021-12-01 RX ORDER — MELOXICAM 7.5 MG/1
7.5 TABLET ORAL DAILY
Qty: 30 TABLET | Refills: 0 | Status: SHIPPED | OUTPATIENT
Start: 2021-12-01 | End: 2021-12-31

## 2021-12-01 NOTE — PROGRESS NOTES
December 1, 2021    Hello, may I speak with Denise Mulligan?    My name is FORREST HOPPER      I am  with [facility]  [address] [place] [address] [phone].    Before we get started may I verify your date of birth? 1960    I am calling to officially welcome you to our practice and ask about your recent visit. Is this a good time to talk? No, LEFT VOICEMAIL.

## 2022-01-18 DIAGNOSIS — M19.011 ARTHRITIS OF RIGHT ACROMIOCLAVICULAR JOINT: ICD-10-CM

## 2022-01-18 DIAGNOSIS — M25.511 CHRONIC RIGHT SHOULDER PAIN: ICD-10-CM

## 2022-01-18 DIAGNOSIS — G89.29 CHRONIC RIGHT SHOULDER PAIN: ICD-10-CM

## 2022-01-18 DIAGNOSIS — M75.41 IMPINGEMENT SYNDROME OF RIGHT SHOULDER: ICD-10-CM

## 2022-01-18 RX ORDER — LIDOCAINE AND PRILOCAINE 25; 25 MG/G; MG/G
CREAM TOPICAL
Qty: 30 G | Refills: 1 | Status: SHIPPED | OUTPATIENT
Start: 2022-01-18 | End: 2022-10-17

## 2022-03-10 ENCOUNTER — TRANSCRIBE ORDERS (OUTPATIENT)
Dept: GENERAL RADIOLOGY | Facility: HOSPITAL | Age: 62
End: 2022-03-10

## 2022-03-10 DIAGNOSIS — R04.2 HEMOPTYSIS: Primary | ICD-10-CM

## 2022-03-14 ENCOUNTER — APPOINTMENT (OUTPATIENT)
Dept: CT IMAGING | Facility: HOSPITAL | Age: 62
End: 2022-03-14

## 2022-03-28 ENCOUNTER — HOSPITAL ENCOUNTER (OUTPATIENT)
Dept: CT IMAGING | Facility: HOSPITAL | Age: 62
Discharge: HOME | End: 2022-03-28

## 2022-03-28 ENCOUNTER — LAB (OUTPATIENT)
Dept: LAB | Facility: HOSPITAL | Age: 62
End: 2022-03-28

## 2022-03-28 DIAGNOSIS — R04.2 HEMOPTYSIS: ICD-10-CM

## 2022-03-28 LAB
BUN SERPL-MCNC: 9 MG/DL (ref 6–20)
CREAT SERPL-MCNC: 0.7 MG/DL (ref 0.57–1)
EGFRCR SERPLBLD CKD-EPI 2021: 99.8 ML/MIN/1.73

## 2022-03-28 PROCEDURE — 82565 ASSAY OF CREATININE: CPT

## 2022-03-28 PROCEDURE — 71260 CT THORAX DX C+: CPT

## 2022-03-28 PROCEDURE — 36415 COLL VENOUS BLD VENIPUNCTURE: CPT

## 2022-03-28 PROCEDURE — 25010000002 IOPAMIDOL 61 % SOLUTION: Performed by: FAMILY MEDICINE

## 2022-03-28 PROCEDURE — 84520 ASSAY OF UREA NITROGEN: CPT

## 2022-03-28 RX ADMIN — IOPAMIDOL 90 ML: 612 INJECTION, SOLUTION INTRAVENOUS at 11:08

## 2022-04-20 ENCOUNTER — TRANSCRIBE ORDERS (OUTPATIENT)
Dept: ADMINISTRATIVE | Facility: HOSPITAL | Age: 62
End: 2022-04-20

## 2022-04-20 DIAGNOSIS — E78.00 PURE HYPERCHOLESTEROLEMIA: ICD-10-CM

## 2022-04-20 DIAGNOSIS — I71.21 ASCENDING AORTIC ANEURYSM: Primary | ICD-10-CM

## 2022-04-20 DIAGNOSIS — I10 HYPERTENSION, ESSENTIAL: ICD-10-CM

## 2022-05-19 ENCOUNTER — OFFICE VISIT (OUTPATIENT)
Dept: CARDIAC SURGERY | Facility: CLINIC | Age: 62
End: 2022-05-19

## 2022-05-19 VITALS
BODY MASS INDEX: 33.1 KG/M2 | SYSTOLIC BLOOD PRESSURE: 146 MMHG | DIASTOLIC BLOOD PRESSURE: 86 MMHG | HEIGHT: 59 IN | TEMPERATURE: 98.2 F | HEART RATE: 70 BPM | OXYGEN SATURATION: 98 % | WEIGHT: 164.2 LBS

## 2022-05-19 DIAGNOSIS — I71.20 THORACIC AORTIC ANEURYSM WITHOUT RUPTURE: Primary | ICD-10-CM

## 2022-05-19 DIAGNOSIS — I10 BENIGN ESSENTIAL HTN: ICD-10-CM

## 2022-05-19 DIAGNOSIS — I50.22 CHRONIC SYSTOLIC CHF (CONGESTIVE HEART FAILURE): Chronic | ICD-10-CM

## 2022-05-19 DIAGNOSIS — E78.2 MIXED HYPERLIPIDEMIA: ICD-10-CM

## 2022-05-19 PROCEDURE — 99204 OFFICE O/P NEW MOD 45 MIN: CPT | Performed by: THORACIC SURGERY (CARDIOTHORACIC VASCULAR SURGERY)

## 2022-05-19 RX ORDER — IBUPROFEN 200 MG
200 TABLET ORAL EVERY 6 HOURS PRN
COMMUNITY

## 2022-05-19 RX ORDER — ONDANSETRON 4 MG/1
4 TABLET, FILM COATED ORAL EVERY 8 HOURS PRN
COMMUNITY

## 2022-07-12 PROBLEM — E78.2 MIXED HYPERLIPIDEMIA: Status: ACTIVE | Noted: 2022-07-12

## 2022-07-12 PROBLEM — I71.20 THORACIC AORTIC ANEURYSM WITHOUT RUPTURE: Status: ACTIVE | Noted: 2022-07-12

## 2022-07-12 PROBLEM — I10 BENIGN ESSENTIAL HTN: Status: ACTIVE | Noted: 2022-07-12

## 2022-07-12 NOTE — PROGRESS NOTES
5/19/2022    Denise Mulligan  1960    Chief Complaint:    Chief Complaint   Patient presents with   • Thoracic Aneurysm       HPI:      PCP:  Portillo Nichols MD    62 y.o. female with HTN(stable, increased risk stroke, rupture) and Hyperlipidemia(stable, increased risk cardiovascular events) , CHF(stable, increase risk cardiovascular events), thoracic aortic aneurysm(new, increase risk rupture).  former smoker.  Asymptomatic thoracic aortic aneurysm..  No TIA stroke amaurosis.  No MI claudication. No other associated signs, symptoms or modifying factors.    1/2015 CT Chest:  Ascending aorta 34mm, arch 24mm, descending 20mm, abdominal 13mm, iliac R 8mm, L 8mm.  12/2019 CT Chest:  Ascending aorta 35mm, arch 25mm, descending 23mm, abdominal 17mm.  3/2022 CT Chest:  Ascending aorta 35mm, arch 25mm, descending 20mm, abdominal 16mm.    9/2019 Echocardiogram:  EF 40%, LA 35mm, LV 40mm, RVSP 20mmHg.  Mild MR, trace TR. Mid/ Distal septum, Anterolateral wall/ apex hypokinetic/dyskinetic.  Left ventricular diastolic dysfunction (grade I) consistent with impaired relaxation.  consistent with stress-induced (Takotsubo) cardiomyopathy.  9/20169 Cardiac Catheterization:  Mild irregularities  12/2019 ECG:  NSR 83, QTc 458    The following portions of the patient's history were reviewed and updated as appropriate: allergies, current medications, past family history, past medical history, past social history, past surgical history and problem list.  Recent images independently reviewed.  Available laboratory values reviewed.    PMH:  Past Medical History:   Diagnosis Date   • Arthritis    • Fibromyalgia    • Hypertension    • MI (myocardial infarction) (HCC) 09/01/2019    was Pre MI 12/2019.       Past Surgical History:   Procedure Laterality Date   • CARDIAC CATHETERIZATION N/A 9/2/2019    Procedure: Left Heart Cath/ PCI if indicated;  Surgeon: Laura Aleman MD;  Location: [facility] INVASIVE LOCATION;  Service:  "Cardiovascular     Family History   Problem Relation Age of Onset   • Heart disease Other    • Hypertension Other    • Diabetes Other    • Cancer Other      Social History     Tobacco Use   • Smoking status: Never Smoker   • Smokeless tobacco: Never Used   Substance Use Topics   • Alcohol use: No   • Drug use: No       ALLERGIES:  Allergies   Allergen Reactions   • Biaxin [Clarithromycin]    • Lovenox [Enoxaparin] Itching   • Penicillins    • Prednisone Anxiety and Mental Status Change     Patient states that she becomes very \"mean and irritable\".          MEDICATIONS:    Current Outpatient Medications:   •  HYDROcodone-acetaminophen (NORCO) 7.5-325 MG per tablet, Take 1 tablet by mouth Every 6 (Six) Hours As Needed for Moderate Pain ., Disp: , Rfl:   •  ibuprofen (ADVIL,MOTRIN) 200 MG tablet, Take 200 mg by mouth Every 6 (Six) Hours As Needed for Mild Pain ., Disp: , Rfl:   •  ondansetron (ZOFRAN) 4 MG tablet, Take 4 mg by mouth Every 8 (Eight) Hours As Needed for Nausea or Vomiting., Disp: , Rfl:   •  atorvastatin (LIPITOR) 20 MG tablet, Take 1 tablet by mouth Every Night., Disp: 30 tablet, Rfl: 0  •  carvedilol (COREG) 3.125 MG tablet, Take 1 tablet by mouth Every 12 (Twelve) Hours., Disp: 60 tablet, Rfl: 0  •  lidocaine-prilocaine (EMLA) 2.5-2.5 % cream, apply topically TO THE appropriate AREA AS DIRECTED ONE time FOR ONE DOSE, Disp: 30 g, Rfl: 1  •  losartan (COZAAR) 25 MG tablet, Take 1 tablet by mouth Daily., Disp: 30 tablet, Rfl: 0  •  omeprazole (priLOSEC) 40 MG capsule, Take 40 mg by mouth Daily., Disp: , Rfl:   •  spironolactone (ALDACTONE) 25 MG tablet, Take 0.5 tablets by mouth Daily., Disp: 30 tablet, Rfl: 0    Review of Systems   Review of Systems   Constitutional: Negative for malaise/fatigue and weight loss.   Cardiovascular: Negative for chest pain, claudication and dyspnea on exertion.   Respiratory: Negative for cough and shortness of breath.    Skin: Negative for color change and poor wound " "healing.   Neurological: Negative for dizziness, numbness and weakness.       Physical Exam   Vitals:    05/19/22 1358 05/19/22 1359   BP: 142/88 146/86   BP Location: Left arm Right arm   Pulse: 70    Temp: 98.2 °F (36.8 °C)    TempSrc: Infrared    SpO2: 98%    Weight: 74.5 kg (164 lb 3.2 oz)    Height: 149.9 cm (59\")      Body surface area is 1.7 meters squared.  Body mass index is 33.16 kg/m².  Physical Exam  Constitutional:       General: She is not in acute distress.     Appearance: She is not ill-appearing.   HENT:      Right Ear: Hearing normal.      Left Ear: Hearing normal.      Nose: No nasal deformity.      Mouth/Throat:      Dentition: Normal dentition. Does not have dentures.   Cardiovascular:      Rate and Rhythm: Normal rate and regular rhythm.      Pulses:           Carotid pulses are 2+ on the right side and 2+ on the left side.       Radial pulses are 2+ on the right side and 2+ on the left side.        Posterior tibial pulses are 2+ on the right side and 2+ on the left side.      Heart sounds: No murmur heard.  Pulmonary:      Effort: Pulmonary effort is normal.      Breath sounds: Normal breath sounds.   Abdominal:      General: There is no distension.      Palpations: Abdomen is soft. There is no mass.      Tenderness: There is no abdominal tenderness.   Musculoskeletal:         General: No deformity.      Comments: Gait normal.    Skin:     General: Skin is warm and dry.      Coloration: Skin is not pale.      Findings: No erythema.      Comments: No venous staining   Neurological:      Mental Status: She is alert and oriented to person, place, and time.   Psychiatric:         Speech: Speech normal.         Behavior: Behavior is cooperative.         Thought Content: Thought content normal.         Judgment: Judgment normal.         BUN   Date Value Ref Range Status   03/28/2022 9 6 - 20 mg/dL Final     Creatinine   Date Value Ref Range Status   03/28/2022 0.70 0.57 - 1.00 mg/dL Final     eGFR " Non  Amer   Date Value Ref Range Status   12/17/2019 114 >60 mL/min/1.73 Final       ASSESSMENT:  Diagnoses and all orders for this visit:    1. Thoracic aortic aneurysm without rupture (HCC) (Primary)  -     CT Chest Without Contrast Diagnostic; Future    2. Benign essential HTN    3. Mixed hyperlipidemia    4. Chronic systolic CHF (congestive heart failure) (HCC)      PLAN:  Detailed discussion with Denise Mulligan regarding situation and options.  Aneurysm ascending thoracic aorta.  Surgical intervention not indicated at this time.  Will plan to follow with interval imaging.  Smoking cessation, lipid management, BP control in 120 range advised.  Discussed symptoms of rupture, details of surgical repair including aortic root replacement, reimplantation of coronary arteries, possible valve replacement, endovascular and open repair.  Risks, benefits discussed.  Understands and wishes to proceed with plan    Return in 5 years with CT Chest without contrast    Return after above studies complete  Recommended regular physical activity, progressive walking program.  Continue current medications as directed.  Will Obtain relevant old records.  Advance Care Planning   ACP discussion was declined by the patient. Patient does not have an advance directive, declines further assistance.     Thank you for the opportunity to participate in this patient's care.    Copy to primary care provider.

## 2022-10-17 DIAGNOSIS — M19.011 ARTHRITIS OF RIGHT ACROMIOCLAVICULAR JOINT: ICD-10-CM

## 2022-10-17 DIAGNOSIS — G89.29 CHRONIC RIGHT SHOULDER PAIN: ICD-10-CM

## 2022-10-17 DIAGNOSIS — M25.511 CHRONIC RIGHT SHOULDER PAIN: ICD-10-CM

## 2022-10-17 DIAGNOSIS — M75.41 IMPINGEMENT SYNDROME OF RIGHT SHOULDER: ICD-10-CM

## 2022-10-17 RX ORDER — LIDOCAINE AND PRILOCAINE 25; 25 MG/G; MG/G
CREAM TOPICAL
Qty: 30 G | Refills: 2 | Status: SHIPPED | OUTPATIENT
Start: 2022-10-17

## 2022-10-17 NOTE — TELEPHONE ENCOUNTER
Tried to reach patient to inform her RX requesting has been approved and lidocaine patches have been sent to her pharmacy     No answer  Left a message for patient to call back

## 2023-01-27 ENCOUNTER — HOSPITAL ENCOUNTER (OUTPATIENT)
Dept: CT IMAGING | Facility: HOSPITAL | Age: 63
Discharge: HOME | End: 2023-01-27
Admitting: FAMILY MEDICINE
Payer: COMMERCIAL

## 2023-01-27 DIAGNOSIS — G44.82 HEADACHE ASSOCIATED WITH SEXUAL ACTIVITY: ICD-10-CM

## 2023-01-27 PROCEDURE — 70450 CT HEAD/BRAIN W/O DYE: CPT

## (undated) DEVICE — CATH DIAG EXPO M/ PK 6FR FL4/FR4 PIG 3PK

## (undated) DEVICE — INTRO SHEATH ART/FEM ENGAGE .038 6F12CM

## (undated) DEVICE — ARTERIAL NEEDLE: Brand: UNBRANDED

## (undated) DEVICE — GW PERIPH GUIDERIGHT STD/J/TP PTFE/PCOAT SS 0.038IN 5X150CM

## (undated) DEVICE — PK CATH LAB 60

## (undated) DEVICE — ELECTRODE,RT,STRESS,FOAM,50PK: Brand: MEDLINE

## (undated) DEVICE — ST CVR PROB PULLUP ULTRASND 5X48IN

## (undated) DEVICE — MODEL BT2000 P/N 700287-012KIT CONTENTS: MANIFOLD WITH SALINE AND CONTRAST PORTS, SALINE TUBING WITH SPIKE AND HAND SYRINGE, TRANSDUCER: Brand: BT2000 AUTOMATED MANIFOLD KIT

## (undated) DEVICE — PERCLOSE PROGLIDE™ SUTURE-MEDIATED CLOSURE SYSTEM: Brand: PERCLOSE PROGLIDE™